# Patient Record
Sex: FEMALE | Race: WHITE | NOT HISPANIC OR LATINO | Employment: FULL TIME | URBAN - METROPOLITAN AREA
[De-identification: names, ages, dates, MRNs, and addresses within clinical notes are randomized per-mention and may not be internally consistent; named-entity substitution may affect disease eponyms.]

---

## 2017-09-18 ENCOUNTER — OFFICE VISIT (OUTPATIENT)
Dept: OBSTETRICS AND GYNECOLOGY | Facility: CLINIC | Age: 37
End: 2017-09-18

## 2017-09-18 VITALS
WEIGHT: 209 LBS | BODY MASS INDEX: 33.59 KG/M2 | DIASTOLIC BLOOD PRESSURE: 99 MMHG | HEIGHT: 66 IN | HEART RATE: 110 BPM | SYSTOLIC BLOOD PRESSURE: 145 MMHG

## 2017-09-18 DIAGNOSIS — Z01.411 ENCOUNTER FOR GYNECOLOGICAL EXAMINATION WITH ABNORMAL FINDING: Primary | ICD-10-CM

## 2017-09-18 DIAGNOSIS — N92.0 MENORRHAGIA WITH REGULAR CYCLE: ICD-10-CM

## 2017-09-18 DIAGNOSIS — N94.6 DYSMENORRHEA: ICD-10-CM

## 2017-09-18 PROCEDURE — 99203 OFFICE O/P NEW LOW 30 MIN: CPT | Performed by: OBSTETRICS & GYNECOLOGY

## 2017-09-18 RX ORDER — PROMETHAZINE HYDROCHLORIDE 12.5 MG/1
TABLET ORAL
COMMUNITY
End: 2021-04-19

## 2017-09-18 RX ORDER — GABAPENTIN 100 MG/1
100 CAPSULE ORAL 3 TIMES DAILY
COMMUNITY
End: 2020-06-29

## 2017-09-18 RX ORDER — TRAMADOL HYDROCHLORIDE 50 MG/1
50 TABLET ORAL EVERY 6 HOURS PRN
COMMUNITY
End: 2019-09-10 | Stop reason: SDUPTHER

## 2017-09-18 NOTE — PROGRESS NOTES
Subjective   Serenity Colbert is a 37 y.o. female  2, Para 2 AB 0, Living 2.  Last annual 2y, last pap 2y, last mammogram 0, last colonoscopy 0.  Cc: Irregular bleeding  History of Present Illness  She has a three-month history of heavy periods.  Prior That time her periods have been regular with moderate flow.  She is passing blood clots and having significant dysmenorrhea and she is losing time at work.  She does have a history of migraines associated with an aura and her migraines seem to appear around ovulation time.  Denies dyspareunia.  Is having some hot flashes.  Patient's family history is positive for breast cancer in her mother at age 49.  ( at age 54 metastatic disease).  She is having postovulatory bloating.  The following portions of the patient's history were reviewed and updated as appropriate: allergies, current medications, past family history, past medical history, past social history, past surgical history and problem list.    Review of Systems   Genitourinary: Positive for menstrual problem.   All other systems reviewed and are negative.        Past Medical History:   Diagnosis Date   • Atrial septal defect     as a child. -repared   • Fibromyalgia    • Ganglion cyst    • Lyme disease    • Migraine with aura      Menstrual History:  OB History      Para Term  AB TAB SAB Ectopic Multiple Living    2 2 2       2         Menarche age:9  Patient's last menstrual period was 2017 (exact date).  Period Cycle (Days): 28  Period Duration (Days): 8  Period Pattern: Regular  Menstrual Flow: Heavy  Menstrual Control: Maxi pad  Menstrual Control Change Freq (Hours): 2  Dysmenorrhea: (!) Severe  Dysmenorrhea Symptoms: Cramping    Past Surgical History:   Procedure Laterality Date   • CARDIAC SURGERY      open heart for defect repair of VSD   • GALLBLADDER SURGERY     • GANGLION CYST EXCISION       OB History      Para Term  AB TAB SAB Ectopic Multiple Living  "   2 2 2       2        Family History   Problem Relation Age of Onset   • Breast cancer Mother 49   • Heart attack Father    • Leukemia Paternal Grandmother    • Colon cancer Maternal Aunt      History   Smoking Status   • Never Smoker   Smokeless Tobacco   • Never Used     History   Alcohol Use   • Yes     Comment: Rare     Health Maintenance   Topic Date Due   • TDAP/TD VACCINES (1 - Tdap) 06/26/1999   • INFLUENZA VACCINE  08/01/2017   • PAP SMEAR  09/18/2018       Current Outpatient Prescriptions:   •  gabapentin (NEURONTIN) 100 MG capsule, Take 100 mg by mouth 3 (Three) Times a Day., Disp: , Rfl:   •  linaclotide (LINZESS) 145 MCG capsule capsule, Take 145 mcg by mouth Every Morning Before Breakfast., Disp: , Rfl:   •  Milnacipran HCl (SAVELLA) 100 MG tablet, Take  by mouth., Disp: , Rfl:   •  traMADol (ULTRAM) 50 MG tablet, Take 50 mg by mouth Every 6 (Six) Hours As Needed for Moderate Pain ., Disp: , Rfl:   •  promethazine (PHENERGAN) 12.5 MG tablet, Take  by mouth., Disp: , Rfl:   Sexual History:Active  Sexually Transmitted Infection History: None        Objective   Vitals:    09/18/17 1534   BP: 145/99   Pulse: 110   Weight: 209 lb (94.8 kg)   Height: 65.5\" (166.4 cm)     Physical Exam   Constitutional: She is oriented to person, place, and time. She appears well-developed and well-nourished.   HENT:   Head: Normocephalic.   Eyes: Pupils are equal, round, and reactive to light.   Neck: Normal range of motion. No thyromegaly present.   Cardiovascular: Normal rate, regular rhythm, normal heart sounds and intact distal pulses.    Pulmonary/Chest: Effort normal and breath sounds normal. No respiratory distress. She exhibits no tenderness. Right breast exhibits no inverted nipple, no mass, no nipple discharge, no skin change and no tenderness. Left breast exhibits no inverted nipple, no mass, no nipple discharge, no skin change and no tenderness. Breasts are symmetrical.   Abdominal: Soft. Bowel sounds are " normal. Hernia confirmed negative in the right inguinal area and confirmed negative in the left inguinal area.   Genitourinary: Rectum normal, vagina normal and uterus normal. Rectal exam shows no external hemorrhoid, no internal hemorrhoid, no fissure, no mass, no tenderness and anal tone normal. No breast tenderness or discharge. Pelvic exam was performed with patient supine. There is no rash, tenderness, lesion or injury on the right labia. There is no rash, tenderness, lesion or injury on the left labia. Uterus is not enlarged and not tender. Cervix exhibits no motion tenderness, no discharge and no friability. Right adnexum displays no mass, no tenderness and no fullness. Left adnexum displays no mass, no tenderness and no fullness.   Lymphadenopathy:     She has no cervical adenopathy.        Right: No inguinal adenopathy present.        Left: No inguinal adenopathy present.   Neurological: She is alert and oriented to person, place, and time. She has normal reflexes.   Skin: Skin is warm and dry.   Psychiatric: She has a normal mood and affect. Her behavior is normal. Judgment and thought content normal.         Assessment/Plan   Serenity was seen today for menstrual problem.    Diagnoses and all orders for this visit:    Encounter for gynecological examination with abnormal finding    Menorrhagia with regular cycle  Comments:  Schedule ultrasound was same-day appointment    Dysmenorrhea    Counseled about menses, use of birth control pills with estrogen in migraine patient's, breast self-examination

## 2017-09-25 ENCOUNTER — OFFICE VISIT (OUTPATIENT)
Dept: OBSTETRICS AND GYNECOLOGY | Facility: CLINIC | Age: 37
End: 2017-09-25

## 2017-09-25 ENCOUNTER — PROCEDURE VISIT (OUTPATIENT)
Dept: OBSTETRICS AND GYNECOLOGY | Facility: CLINIC | Age: 37
End: 2017-09-25

## 2017-09-25 VITALS
BODY MASS INDEX: 33.59 KG/M2 | WEIGHT: 209 LBS | SYSTOLIC BLOOD PRESSURE: 147 MMHG | DIASTOLIC BLOOD PRESSURE: 96 MMHG | HEART RATE: 98 BPM | HEIGHT: 66 IN

## 2017-09-25 DIAGNOSIS — N92.0 MENORRHAGIA WITH REGULAR CYCLE: Primary | ICD-10-CM

## 2017-09-25 DIAGNOSIS — N94.6 DYSMENORRHEA: ICD-10-CM

## 2017-09-25 PROCEDURE — 99213 OFFICE O/P EST LOW 20 MIN: CPT | Performed by: OBSTETRICS & GYNECOLOGY

## 2017-09-25 PROCEDURE — 76830 TRANSVAGINAL US NON-OB: CPT | Performed by: OBSTETRICS & GYNECOLOGY

## 2017-09-25 RX ORDER — ACETAMINOPHEN AND CODEINE PHOSPHATE 120; 12 MG/5ML; MG/5ML
1 SOLUTION ORAL DAILY
Qty: 28 TABLET | Refills: 12 | Status: SHIPPED | OUTPATIENT
Start: 2017-09-25 | End: 2018-09-25

## 2018-10-22 RX ORDER — ACETAMINOPHEN AND CODEINE PHOSPHATE 120; 12 MG/5ML; MG/5ML
1 SOLUTION ORAL DAILY
Qty: 28 TABLET | Refills: 3 | Status: SHIPPED | OUTPATIENT
Start: 2018-10-22 | End: 2019-10-22

## 2019-09-10 ENCOUNTER — OFFICE VISIT (OUTPATIENT)
Dept: FAMILY MEDICINE CLINIC | Facility: CLINIC | Age: 39
End: 2019-09-10

## 2019-09-10 VITALS
OXYGEN SATURATION: 99 % | HEIGHT: 65 IN | TEMPERATURE: 97.6 F | DIASTOLIC BLOOD PRESSURE: 80 MMHG | HEART RATE: 106 BPM | SYSTOLIC BLOOD PRESSURE: 118 MMHG | WEIGHT: 158 LBS | BODY MASS INDEX: 26.33 KG/M2

## 2019-09-10 DIAGNOSIS — G89.29 OTHER CHRONIC PAIN: ICD-10-CM

## 2019-09-10 DIAGNOSIS — F51.01 PRIMARY INSOMNIA: ICD-10-CM

## 2019-09-10 DIAGNOSIS — J30.1 ALLERGIC RHINITIS DUE TO POLLEN, UNSPECIFIED SEASONALITY: ICD-10-CM

## 2019-09-10 DIAGNOSIS — E53.8 B12 DEFICIENCY: ICD-10-CM

## 2019-09-10 DIAGNOSIS — Z79.899 HIGH RISK MEDICATION USE: Primary | ICD-10-CM

## 2019-09-10 DIAGNOSIS — E55.9 VITAMIN D DEFICIENCY: ICD-10-CM

## 2019-09-10 DIAGNOSIS — R53.83 FATIGUE, UNSPECIFIED TYPE: ICD-10-CM

## 2019-09-10 DIAGNOSIS — G43.109 MIGRAINE WITH AURA AND WITHOUT STATUS MIGRAINOSUS, NOT INTRACTABLE: ICD-10-CM

## 2019-09-10 DIAGNOSIS — F41.9 ANXIETY: ICD-10-CM

## 2019-09-10 DIAGNOSIS — M79.7 FIBROMYALGIA: ICD-10-CM

## 2019-09-10 PROCEDURE — 99214 OFFICE O/P EST MOD 30 MIN: CPT | Performed by: FAMILY MEDICINE

## 2019-09-10 RX ORDER — FLUTICASONE PROPIONATE 50 MCG
2 SPRAY, SUSPENSION (ML) NASAL DAILY
Qty: 1 BOTTLE | Refills: 5 | Status: SHIPPED | OUTPATIENT
Start: 2019-09-10 | End: 2021-04-19 | Stop reason: SDUPTHER

## 2019-09-10 RX ORDER — CYANOCOBALAMIN 1000 UG/ML
1000 INJECTION, SOLUTION INTRAMUSCULAR; SUBCUTANEOUS
Qty: 10 ML | Refills: 1 | Status: SHIPPED | OUTPATIENT
Start: 2019-09-10 | End: 2020-06-29 | Stop reason: SDUPTHER

## 2019-09-10 RX ORDER — HYDROXYZINE HYDROCHLORIDE 25 MG/1
25 TABLET, FILM COATED ORAL
COMMUNITY
End: 2019-09-10 | Stop reason: SDUPTHER

## 2019-09-10 RX ORDER — ONDANSETRON 4 MG/1
4 TABLET, FILM COATED ORAL AS NEEDED
COMMUNITY
End: 2020-06-29 | Stop reason: SDUPTHER

## 2019-09-10 RX ORDER — TOPIRAMATE 100 MG/1
100 CAPSULE, EXTENDED RELEASE ORAL DAILY
COMMUNITY
End: 2019-09-15 | Stop reason: SDUPTHER

## 2019-09-10 RX ORDER — HYDROXYZINE HYDROCHLORIDE 25 MG/1
25 TABLET, FILM COATED ORAL
Qty: 30 TABLET | Refills: 3 | Status: SHIPPED | OUTPATIENT
Start: 2019-09-10 | End: 2020-01-20

## 2019-09-10 RX ORDER — TRAMADOL HYDROCHLORIDE 50 MG/1
50 TABLET ORAL EVERY 6 HOURS PRN
Qty: 120 TABLET | Refills: 2 | Status: SHIPPED | OUTPATIENT
Start: 2019-09-10 | End: 2020-03-24 | Stop reason: SDUPTHER

## 2019-09-10 RX ORDER — CYANOCOBALAMIN 1000 UG/ML
1000 INJECTION, SOLUTION INTRAMUSCULAR; SUBCUTANEOUS
COMMUNITY
End: 2019-09-10 | Stop reason: SDUPTHER

## 2019-09-10 RX ORDER — AMITRIPTYLINE HYDROCHLORIDE 25 MG/1
25 TABLET, FILM COATED ORAL NIGHTLY
Qty: 30 TABLET | Refills: 3 | Status: SHIPPED | OUTPATIENT
Start: 2019-09-10 | End: 2020-01-20

## 2019-09-10 NOTE — PROGRESS NOTES
Chief Complaint   Patient presents with   • Med Refill     Pt is here for refills on her migraine and b12 medication.        Subjective   Serenity Colbert is a 39 y.o. female.     History of Present Illness     PT is here to get reestablished and needs refills and   Fibromyalgia- stable with meds and need refill.  B12 deficiency stable with meds. And stable  Anxiety- needs refills but is not helping her sleep.  Insomnia- wants to try a different medications  Allergies stable with meds needs refills on nose spray.    The following portions of the patient's history were reviewed and updated as appropriate: allergies, current medications, past family history, past medical history, past social history, past surgical history and problem list.    Review of Systems   Constitutional: Negative for fatigue.   Eyes: Negative for blurred vision.   Respiratory: Negative for cough, chest tightness and shortness of breath.    Cardiovascular: Negative for chest pain, palpitations and leg swelling.   Neurological: Negative for dizziness, light-headedness and headache.       Patient Active Problem List   Diagnosis   • Migraine with aura   • Lyme disease   • Ganglion cyst   • Fibromyalgia   • Atrial septal defect       Allergies   Allergen Reactions   • Peanut-Containing Drug Products Anaphylaxis   • Heparin Other (See Comments)     BP dropped    • Ibuprofen Other (See Comments)     Stomach bleeding   • Nsaids          Current Outpatient Medications:   •  cyanocobalamin 1000 MCG/ML injection, Inject 1 mL into the appropriate muscle as directed by prescriber Every 28 (Twenty-Eight) Days., Disp: 10 mL, Rfl: 1  •  linaclotide (LINZESS) 145 MCG capsule capsule, Take 145 mcg by mouth Every Morning Before Breakfast., Disp: , Rfl:   •  Milnacipran HCl (SAVELLA) 100 MG tablet, Take 100 tablets by mouth 2 (Two) Times a Day., Disp: 60 tablet, Rfl: 5  •  ondansetron (ZOFRAN) 4 MG tablet, Take 4 mg by mouth As Needed for Nausea or Vomiting.,  "Disp: , Rfl:   •  Topiramate  MG capsule sustained-release 24 hr, Take 100 mg by mouth Daily., Disp: , Rfl:   •  traMADol (ULTRAM) 50 MG tablet, Take 1 tablet by mouth Every 6 (Six) Hours As Needed for Moderate Pain ., Disp: 120 tablet, Rfl: 2  •  amitriptyline (ELAVIL) 25 MG tablet, Take 1 tablet by mouth Every Night., Disp: 30 tablet, Rfl: 3  •  fluticasone (FLONASE) 50 MCG/ACT nasal spray, 2 sprays into the nostril(s) as directed by provider Daily., Disp: 1 bottle, Rfl: 5  •  gabapentin (NEURONTIN) 100 MG capsule, Take 100 mg by mouth 3 (Three) Times a Day., Disp: , Rfl:   •  hydrOXYzine (ATARAX) 25 MG tablet, Take 1 tablet by mouth every night at bedtime., Disp: 30 tablet, Rfl: 3  •  norethindrone (MICRONOR) 0.35 MG tablet, Take 1 tablet by mouth Daily., Disp: 28 tablet, Rfl: 3  •  promethazine (PHENERGAN) 12.5 MG tablet, Take  by mouth., Disp: , Rfl:     Past Medical History:   Diagnosis Date   • GUIDO positive    • Anxiety    • Arthritis    • Atrial septal defect     as a child. -repared   • B12 deficiency    • Fibromyalgia    • Ganglion cyst    • High risk medication use    • IBS (irritable bowel syndrome)    • Lyme disease    • Migraine with aura    • Plantar fasciitis    • Vitamin D deficiency        Past Surgical History:   Procedure Laterality Date   • CARDIAC SURGERY      open heart for defect repair of VSD   • GALLBLADDER SURGERY  2007   • GANGLION CYST EXCISION         Family History   Problem Relation Age of Onset   • Breast cancer Mother 49   • Heart attack Father    • Leukemia Paternal Grandmother    • Colon cancer Maternal Aunt        Social History     Tobacco Use   • Smoking status: Never Smoker   • Smokeless tobacco: Never Used   Substance Use Topics   • Alcohol use: Yes     Comment: Rare            Objective     Visit Vitals  /80   Pulse 106   Temp 97.6 °F (36.4 °C)   Ht 165.1 cm (65\")   Wt 71.7 kg (158 lb)   SpO2 99%   BMI 26.29 kg/m²       Physical Exam   Constitutional: She appears " well-developed. No distress.   Eyes: Conjunctivae and lids are normal.   Neck: Trachea normal. Carotid bruit is not present. No thyroid mass and no thyromegaly present.   Cardiovascular: Normal rate, regular rhythm and normal heart sounds.   Pulmonary/Chest: Effort normal and breath sounds normal.   Lymphadenopathy:     She has no cervical adenopathy.   Neurological: She is alert. She is not disoriented.   Skin: Skin is warm and dry.   Psychiatric: She has a normal mood and affect. Her speech is normal and behavior is normal. She is attentive.   Nursing note and vitals reviewed.      No results found for: GLUCOSE, BUN, CREATININE, EGFRIFNONA, EGFRIFAFRI, BCR, K, CO2, CALCIUM, PROTENTOTREF, ALBUMIN, LABIL2, AST, ALT    No results found for: WBC, RBC, HGB, HCT, MCV, MCH, MCHC, RDW, RDWSD, MPV, PLT, NEUTRORELPCT, LYMPHORELPCT, MONORELPCT, EOSRELPCT, BASORELPCT, AUTOIGPER, NEUTROABS, LYMPHSABS, MONOSABS, EOSABS, BASOSABS, AUTOIGNUM, NRBC    No results found for: HGBA1C    No results found for: QTAKOQDW51    No results found for: TSH    No results found for: CHOL  No results found for: TRIG  No results found for: HDL  No results found for: LDL  No results found for: VLDL  No results found for: LDLHDL      Procedures    Assessment/Plan   Problems Addressed this Visit        Cardiovascular and Mediastinum    Migraine with aura    Relevant Medications    Topiramate  MG capsule sustained-release 24 hr    traMADol (ULTRAM) 50 MG tablet    amitriptyline (ELAVIL) 25 MG tablet       Nervous and Auditory    Fibromyalgia    Relevant Medications    traMADol (ULTRAM) 50 MG tablet    Milnacipran HCl (SAVELLA) 100 MG tablet      Other Visit Diagnoses     High risk medication use    -  Primary    Relevant Orders    Compliance Drug Analysis, Ur - Urine, Clean Catch    Lipid Panel With / Chol / HDL Ratio    Other chronic pain        Relevant Medications    traMADol (ULTRAM) 50 MG tablet    Primary insomnia        Relevant  Medications    amitriptyline (ELAVIL) 25 MG tablet    B12 deficiency        Relevant Medications    cyanocobalamin 1000 MCG/ML injection    Other Relevant Orders    Vitamin B12    Allergic rhinitis due to pollen, unspecified seasonality        Relevant Medications    fluticasone (FLONASE) 50 MCG/ACT nasal spray    Anxiety        Relevant Medications    hydrOXYzine (ATARAX) 25 MG tablet    Fatigue, unspecified type        Relevant Orders    Comprehensive Metabolic Panel    TSH Rfx On Abnormal To Free T4    CBC & Differential    Vitamin D deficiency        Relevant Orders    Vitamin D 25 Hydroxy          Orders Placed This Encounter   Procedures   • Compliance Drug Analysis, Ur - Urine, Clean Catch   • Comprehensive Metabolic Panel   • Lipid Panel With / Chol / HDL Ratio   • Vitamin B12   • TSH Rfx On Abnormal To Free T4   • Vitamin D 25 Hydroxy   • CBC & Differential     Order Specific Question:   Manual Differential     Answer:   No       Current Outpatient Medications   Medication Sig Dispense Refill   • cyanocobalamin 1000 MCG/ML injection Inject 1 mL into the appropriate muscle as directed by prescriber Every 28 (Twenty-Eight) Days. 10 mL 1   • linaclotide (LINZESS) 145 MCG capsule capsule Take 145 mcg by mouth Every Morning Before Breakfast.     • Milnacipran HCl (SAVELLA) 100 MG tablet Take 100 tablets by mouth 2 (Two) Times a Day. 60 tablet 5   • ondansetron (ZOFRAN) 4 MG tablet Take 4 mg by mouth As Needed for Nausea or Vomiting.     • Topiramate  MG capsule sustained-release 24 hr Take 100 mg by mouth Daily.     • traMADol (ULTRAM) 50 MG tablet Take 1 tablet by mouth Every 6 (Six) Hours As Needed for Moderate Pain . 120 tablet 2   • amitriptyline (ELAVIL) 25 MG tablet Take 1 tablet by mouth Every Night. 30 tablet 3   • fluticasone (FLONASE) 50 MCG/ACT nasal spray 2 sprays into the nostril(s) as directed by provider Daily. 1 bottle 5   • gabapentin (NEURONTIN) 100 MG capsule Take 100 mg by mouth 3  (Three) Times a Day.     • hydrOXYzine (ATARAX) 25 MG tablet Take 1 tablet by mouth every night at bedtime. 30 tablet 3   • norethindrone (MICRONOR) 0.35 MG tablet Take 1 tablet by mouth Daily. 28 tablet 3   • promethazine (PHENERGAN) 12.5 MG tablet Take  by mouth.       No current facility-administered medications for this visit.        Return in about 3 months (around 12/10/2019).    There are no Patient Instructions on file for this visit.

## 2019-09-11 LAB
25(OH)D3+25(OH)D2 SERPL-MCNC: 23.6 NG/ML (ref 30–100)
ALBUMIN SERPL-MCNC: 4.6 G/DL (ref 3.5–5.2)
ALBUMIN/GLOB SERPL: 1.9 G/DL
ALP SERPL-CCNC: 89 U/L (ref 39–117)
ALT SERPL-CCNC: 8 U/L (ref 1–33)
AST SERPL-CCNC: 13 U/L (ref 1–32)
BASOPHILS # BLD AUTO: 0.05 10*3/MM3 (ref 0–0.2)
BASOPHILS NFR BLD AUTO: 0.3 % (ref 0–1.5)
BILIRUB SERPL-MCNC: 0.6 MG/DL (ref 0.2–1.2)
BUN SERPL-MCNC: 6 MG/DL (ref 6–20)
BUN/CREAT SERPL: 9 (ref 7–25)
CALCIUM SERPL-MCNC: 8.9 MG/DL (ref 8.6–10.5)
CHLORIDE SERPL-SCNC: 103 MMOL/L (ref 98–107)
CHOLEST SERPL-MCNC: 159 MG/DL (ref 0–200)
CHOLEST/HDLC SERPL: 2.79 {RATIO}
CO2 SERPL-SCNC: 22.6 MMOL/L (ref 22–29)
CREAT SERPL-MCNC: 0.67 MG/DL (ref 0.57–1)
EOSINOPHIL # BLD AUTO: 0.31 10*3/MM3 (ref 0–0.4)
EOSINOPHIL NFR BLD AUTO: 1.7 % (ref 0.3–6.2)
ERYTHROCYTE [DISTWIDTH] IN BLOOD BY AUTOMATED COUNT: 14.4 % (ref 12.3–15.4)
GLOBULIN SER CALC-MCNC: 2.4 GM/DL
GLUCOSE SERPL-MCNC: 78 MG/DL (ref 65–99)
HCT VFR BLD AUTO: 47.4 % (ref 34–46.6)
HDLC SERPL-MCNC: 57 MG/DL (ref 40–60)
HGB BLD-MCNC: 14.4 G/DL (ref 12–15.9)
IMM GRANULOCYTES # BLD AUTO: 0.09 10*3/MM3 (ref 0–0.05)
IMM GRANULOCYTES NFR BLD AUTO: 0.5 % (ref 0–0.5)
LDLC SERPL CALC-MCNC: 81 MG/DL (ref 0–100)
LYMPHOCYTES # BLD AUTO: 2.04 10*3/MM3 (ref 0.7–3.1)
LYMPHOCYTES NFR BLD AUTO: 11 % (ref 19.6–45.3)
MCH RBC QN AUTO: 26.9 PG (ref 26.6–33)
MCHC RBC AUTO-ENTMCNC: 30.4 G/DL (ref 31.5–35.7)
MCV RBC AUTO: 88.4 FL (ref 79–97)
MONOCYTES # BLD AUTO: 0.94 10*3/MM3 (ref 0.1–0.9)
MONOCYTES NFR BLD AUTO: 5.1 % (ref 5–12)
NEUTROPHILS # BLD AUTO: 15.11 10*3/MM3 (ref 1.7–7)
NEUTROPHILS NFR BLD AUTO: 81.4 % (ref 42.7–76)
NRBC BLD AUTO-RTO: 0 /100 WBC (ref 0–0.2)
PLATELET # BLD AUTO: 365 10*3/MM3 (ref 140–450)
POTASSIUM SERPL-SCNC: 3.9 MMOL/L (ref 3.5–5.2)
PROT SERPL-MCNC: 7 G/DL (ref 6–8.5)
RBC # BLD AUTO: 5.36 10*6/MM3 (ref 3.77–5.28)
SODIUM SERPL-SCNC: 140 MMOL/L (ref 136–145)
TRIGL SERPL-MCNC: 107 MG/DL (ref 0–150)
TSH SERPL DL<=0.005 MIU/L-ACNC: 3.51 UIU/ML (ref 0.27–4.2)
VIT B12 SERPL-MCNC: 421 PG/ML (ref 211–946)
VLDLC SERPL CALC-MCNC: 21.4 MG/DL
WBC # BLD AUTO: 18.54 10*3/MM3 (ref 3.4–10.8)

## 2019-09-15 DIAGNOSIS — G43.109 MIGRAINE WITH AURA AND WITHOUT STATUS MIGRAINOSUS, NOT INTRACTABLE: Primary | ICD-10-CM

## 2019-09-15 LAB — DRUGS UR: NORMAL

## 2019-09-15 RX ORDER — TOPIRAMATE 100 MG/1
100 CAPSULE, EXTENDED RELEASE ORAL DAILY
Qty: 90 CAPSULE | Refills: 1 | Status: SHIPPED | OUTPATIENT
Start: 2019-09-15 | End: 2019-09-20 | Stop reason: SDUPTHER

## 2019-09-16 DIAGNOSIS — D72.829 LEUKOCYTOSIS, UNSPECIFIED TYPE: Primary | ICD-10-CM

## 2019-09-16 DIAGNOSIS — R10.30 LOWER ABDOMINAL PAIN: ICD-10-CM

## 2019-09-20 DIAGNOSIS — G43.109 MIGRAINE WITH AURA AND WITHOUT STATUS MIGRAINOSUS, NOT INTRACTABLE: ICD-10-CM

## 2019-09-20 RX ORDER — TOPIRAMATE 100 MG/1
100 CAPSULE, EXTENDED RELEASE ORAL DAILY
Qty: 90 CAPSULE | Refills: 1 | Status: SHIPPED | OUTPATIENT
Start: 2019-09-20 | End: 2020-06-23

## 2019-11-25 ENCOUNTER — TELEPHONE (OUTPATIENT)
Dept: FAMILY MEDICINE CLINIC | Facility: CLINIC | Age: 39
End: 2019-11-25

## 2020-01-18 DIAGNOSIS — F51.01 PRIMARY INSOMNIA: ICD-10-CM

## 2020-01-18 DIAGNOSIS — F41.9 ANXIETY: ICD-10-CM

## 2020-01-20 RX ORDER — HYDROXYZINE HYDROCHLORIDE 25 MG/1
TABLET, FILM COATED ORAL
Qty: 30 TABLET | Refills: 2 | Status: SHIPPED | OUTPATIENT
Start: 2020-01-20 | End: 2020-06-29 | Stop reason: SDUPTHER

## 2020-01-20 RX ORDER — AMITRIPTYLINE HYDROCHLORIDE 25 MG/1
TABLET, FILM COATED ORAL
Qty: 30 TABLET | Refills: 2 | Status: SHIPPED | OUTPATIENT
Start: 2020-01-20 | End: 2020-06-29

## 2020-03-19 DIAGNOSIS — M79.7 FIBROMYALGIA: ICD-10-CM

## 2020-03-19 DIAGNOSIS — G89.29 OTHER CHRONIC PAIN: ICD-10-CM

## 2020-03-20 RX ORDER — TRAMADOL HYDROCHLORIDE 50 MG/1
TABLET ORAL
Qty: 120 TABLET | Refills: 1 | OUTPATIENT
Start: 2020-03-20

## 2020-03-24 DIAGNOSIS — M79.7 FIBROMYALGIA: ICD-10-CM

## 2020-03-24 DIAGNOSIS — G89.29 OTHER CHRONIC PAIN: ICD-10-CM

## 2020-03-24 RX ORDER — TRAMADOL HYDROCHLORIDE 50 MG/1
50 TABLET ORAL EVERY 6 HOURS PRN
Qty: 120 TABLET | Refills: 0 | Status: SHIPPED | OUTPATIENT
Start: 2020-03-24 | End: 2020-06-29 | Stop reason: SDUPTHER

## 2020-06-23 DIAGNOSIS — G43.109 MIGRAINE WITH AURA AND WITHOUT STATUS MIGRAINOSUS, NOT INTRACTABLE: ICD-10-CM

## 2020-06-23 RX ORDER — TOPIRAMATE 100 MG/1
CAPSULE, EXTENDED RELEASE ORAL
Qty: 90 CAPSULE | Refills: 0 | Status: SHIPPED | OUTPATIENT
Start: 2020-06-23 | End: 2020-06-29 | Stop reason: SDUPTHER

## 2020-06-29 ENCOUNTER — OFFICE VISIT (OUTPATIENT)
Dept: FAMILY MEDICINE CLINIC | Facility: CLINIC | Age: 40
End: 2020-06-29

## 2020-06-29 VITALS
TEMPERATURE: 98.2 F | WEIGHT: 159.8 LBS | HEIGHT: 65 IN | HEART RATE: 98 BPM | OXYGEN SATURATION: 99 % | BODY MASS INDEX: 26.62 KG/M2 | SYSTOLIC BLOOD PRESSURE: 108 MMHG | DIASTOLIC BLOOD PRESSURE: 72 MMHG

## 2020-06-29 DIAGNOSIS — E53.8 B12 DEFICIENCY: ICD-10-CM

## 2020-06-29 DIAGNOSIS — M79.7 FIBROMYALGIA: ICD-10-CM

## 2020-06-29 DIAGNOSIS — F41.9 ANXIETY: ICD-10-CM

## 2020-06-29 DIAGNOSIS — F32.A ANXIETY AND DEPRESSION: ICD-10-CM

## 2020-06-29 DIAGNOSIS — G43.109 MIGRAINE WITH AURA AND WITHOUT STATUS MIGRAINOSUS, NOT INTRACTABLE: ICD-10-CM

## 2020-06-29 DIAGNOSIS — F41.9 ANXIETY AND DEPRESSION: ICD-10-CM

## 2020-06-29 DIAGNOSIS — R11.0 NAUSEA: Primary | ICD-10-CM

## 2020-06-29 DIAGNOSIS — G89.29 OTHER CHRONIC PAIN: ICD-10-CM

## 2020-06-29 PROCEDURE — 99214 OFFICE O/P EST MOD 30 MIN: CPT | Performed by: FAMILY MEDICINE

## 2020-06-29 RX ORDER — CYANOCOBALAMIN 1000 UG/ML
1000 INJECTION, SOLUTION INTRAMUSCULAR; SUBCUTANEOUS
Qty: 10 ML | Refills: 1 | Status: SHIPPED | OUTPATIENT
Start: 2020-06-29 | End: 2021-04-19 | Stop reason: SDUPTHER

## 2020-06-29 RX ORDER — TRAMADOL HYDROCHLORIDE 50 MG/1
50 TABLET ORAL EVERY 6 HOURS PRN
Qty: 120 TABLET | Refills: 0 | Status: SHIPPED | OUTPATIENT
Start: 2020-06-29 | End: 2020-09-08

## 2020-06-29 RX ORDER — HYDROXYZINE HYDROCHLORIDE 25 MG/1
25 TABLET, FILM COATED ORAL
Qty: 90 TABLET | Refills: 1 | Status: SHIPPED | OUTPATIENT
Start: 2020-06-29 | End: 2022-04-08

## 2020-06-29 RX ORDER — BUPROPION HYDROCHLORIDE 150 MG/1
150 TABLET ORAL DAILY
Qty: 30 TABLET | Refills: 3 | Status: SHIPPED | OUTPATIENT
Start: 2020-06-29 | End: 2020-10-27

## 2020-06-29 RX ORDER — ONDANSETRON 4 MG/1
4 TABLET, FILM COATED ORAL EVERY 8 HOURS PRN
Qty: 60 TABLET | Refills: 0 | Status: SHIPPED | OUTPATIENT
Start: 2020-06-29 | End: 2021-11-05 | Stop reason: SDUPTHER

## 2020-06-29 RX ORDER — TOPIRAMATE 100 MG/1
1 CAPSULE, EXTENDED RELEASE ORAL DAILY
Qty: 90 CAPSULE | Refills: 1 | Status: SHIPPED | OUTPATIENT
Start: 2020-06-29 | End: 2021-04-19 | Stop reason: SDUPTHER

## 2020-06-29 NOTE — PROGRESS NOTES
Chief Complaint   Patient presents with   • Follow-up     Pt is here for follow up. C/o trouble sleeping - stress related  Wants to discuss meds with you. Anxiety and sleep med is not helping.        Subjective   Serenity Colbert is a 40 y.o. female.     History of Present Illness   PT is here for refills and   Insomnia- stopped elavil bc of side effects;  Still uses hydroxyzine but not working as well as before.    Migraines and needs refills on zofran.  Depression and anxiety- stable and needs something for this.  No HI or SI.  Fibromyalgia-- stable and needs refills.    The following portions of the patient's history were reviewed and updated as appropriate: allergies, current medications, past family history, past medical history, past social history, past surgical history and problem list.    Review of Systems   Constitutional: Positive for fatigue.   Eyes: Negative for blurred vision.   Respiratory: Negative for cough, chest tightness and shortness of breath.    Cardiovascular: Negative for chest pain, palpitations and leg swelling.   Neurological: Negative for dizziness, light-headedness and headache.       Patient Active Problem List   Diagnosis   • Migraine with aura   • Lyme disease   • Ganglion cyst   • Fibromyalgia   • Atrial septal defect       Allergies   Allergen Reactions   • Peanut-Containing Drug Products Anaphylaxis   • Heparin Other (See Comments)     BP dropped    • Ibuprofen Other (See Comments)     Stomach bleeding   • Nsaids          Current Outpatient Medications:   •  cyanocobalamin 1000 MCG/ML injection, Inject 1 mL into the appropriate muscle as directed by prescriber Every 28 (Twenty-Eight) Days., Disp: 10 mL, Rfl: 1  •  fluticasone (FLONASE) 50 MCG/ACT nasal spray, 2 sprays into the nostril(s) as directed by provider Daily., Disp: 1 bottle, Rfl: 5  •  hydrOXYzine (ATARAX) 25 MG tablet, Take 1 tablet by mouth every night at bedtime., Disp: 90 tablet, Rfl: 1  •  linaclotide (LINZESS) 145  "MCG capsule capsule, Take 145 mcg by mouth Every Morning Before Breakfast., Disp: , Rfl:   •  Milnacipran HCl (Savella) 100 MG tablet, Take 1 tablet by mouth 2 (Two) Times a Day., Disp: 60 tablet, Rfl: 5  •  ondansetron (ZOFRAN) 4 MG tablet, Take 1 tablet by mouth Every 8 (Eight) Hours As Needed for Nausea or Vomiting., Disp: 60 tablet, Rfl: 0  •  Syringe/Needle, Disp, (BD ECLIPSE SYRINGE) 25G X 1\" 3 ML misc, USE AS DIRECTED FOR B12 INJECTION, Disp: 100 each, Rfl: 11  •  Topiramate ER (Trokendi XR) 100 MG capsule sustained-release 24 hr, Take 1 capsule by mouth Daily., Disp: 90 capsule, Rfl: 1  •  traMADol (ULTRAM) 50 MG tablet, Take 1 tablet by mouth Every 6 (Six) Hours As Needed for Moderate Pain ., Disp: 120 tablet, Rfl: 0  •  buPROPion XL (Wellbutrin XL) 150 MG 24 hr tablet, Take 1 tablet by mouth Daily., Disp: 30 tablet, Rfl: 3  •  promethazine (PHENERGAN) 12.5 MG tablet, Take  by mouth., Disp: , Rfl:     Past Medical History:   Diagnosis Date   • Acne    • Acute pharyngitis    • Acute sinusitis    • GUIDO positive    • Anxiety    • Arthritis    • Atrial septal defect     as a child. -repared   • B12 deficiency    • Balance problem    • Cellulitis    • Chest pain    • Elevated blood pressure reading    • Fatigue    • Fibromyalgia    • Flu-like symptoms    • Ganglion cyst    • Gastric ulcer    • Hand weakness    • High risk medication use    • Hot flashes    • IBS (irritable bowel syndrome)    • Lumbar strain    • Lyme disease    • Memory deficit    • Migraine headache    • Migraine with aura    • Osteoarthritis    • Palpitations    • Panic attacks    • Plantar fasciitis    • Strep throat    • Therapeutic opioid induced constipation    • Tremor    • URI (upper respiratory infection)    • UTI (urinary tract infection)    • Vaginal candidiasis    • Vaginal yeast infection    • Vitamin B12 deficiency    • Vitamin D deficiency        Past Surgical History:   Procedure Laterality Date   • CARDIAC SURGERY      open " "heart for defect repair of VSD   • GALLBLADDER SURGERY  2007   • GANGLION CYST EXCISION         Family History   Problem Relation Age of Onset   • Breast cancer Mother 49   • Heart attack Father    • Diabetes Father    • Hyperlipidemia Father    • Hypertension Father    • Leukemia Paternal Grandmother    • Colon cancer Maternal Aunt    • Hyperlipidemia Brother    • Diabetes Maternal Grandmother    • Asthma Maternal Grandmother    • COPD Maternal Grandmother    • Stroke Maternal Grandfather    • Diabetes Maternal Grandfather    • Diabetes Paternal Grandfather    • Heart disease Other         FH of heart disease in males before age 55       Social History     Tobacco Use   • Smoking status: Never Smoker   • Smokeless tobacco: Never Used   Substance Use Topics   • Alcohol use: Yes     Comment: Rare            Objective     Visit Vitals  /72   Pulse 98   Temp 98.2 °F (36.8 °C)   Ht 165.1 cm (65\")   Wt 72.5 kg (159 lb 12.8 oz)   SpO2 99%   BMI 26.59 kg/m²       Physical Exam   Constitutional: She appears well-developed. No distress.   Eyes: Conjunctivae and lids are normal.   Neck: Trachea normal. Carotid bruit is not present. No thyroid mass and no thyromegaly present.   Cardiovascular: Normal rate, regular rhythm and normal heart sounds.   Pulmonary/Chest: Effort normal and breath sounds normal. No stridor. No respiratory distress. She has no wheezes.   Lymphadenopathy:     She has no cervical adenopathy.   Neurological: She is alert. She is not disoriented.   Skin: Skin is warm and dry.   Psychiatric: She has a normal mood and affect. Her speech is normal and behavior is normal. She is attentive.   Nursing note and vitals reviewed.      Lab Results   Component Value Date    BUN 6 09/10/2019    CREATININE 0.67 09/10/2019    EGFRIFNONA 98 09/10/2019    EGFRIFAFRI 119 09/10/2019    BCR 9.0 09/10/2019    K 3.9 09/10/2019    CO2 22.6 09/10/2019    CALCIUM 8.9 09/10/2019    PROTENTOTREF 7.0 09/10/2019    ALBUMIN 4.60 " 09/10/2019    LABIL2 1.9 09/10/2019    AST 13 09/10/2019    ALT 8 09/10/2019       WBC   Date Value Ref Range Status   09/10/2019 18.54 (H) 3.40 - 10.80 10*3/mm3 Final     RBC   Date Value Ref Range Status   09/10/2019 5.36 (H) 3.77 - 5.28 10*6/mm3 Final     Hemoglobin   Date Value Ref Range Status   09/10/2019 14.4 12.0 - 15.9 g/dL Final     Hematocrit   Date Value Ref Range Status   09/10/2019 47.4 (H) 34.0 - 46.6 % Final     MCV   Date Value Ref Range Status   09/10/2019 88.4 79.0 - 97.0 fL Final     MCH   Date Value Ref Range Status   09/10/2019 26.9 26.6 - 33.0 pg Final     MCHC   Date Value Ref Range Status   09/10/2019 30.4 (L) 31.5 - 35.7 g/dL Final     RDW   Date Value Ref Range Status   09/10/2019 14.4 12.3 - 15.4 % Final     Platelets   Date Value Ref Range Status   09/10/2019 365 140 - 450 10*3/mm3 Final     Neutrophil Rel %   Date Value Ref Range Status   09/10/2019 81.4 (H) 42.7 - 76.0 % Final     Lymphocyte Rel %   Date Value Ref Range Status   09/10/2019 11.0 (L) 19.6 - 45.3 % Final     Monocyte Rel %   Date Value Ref Range Status   09/10/2019 5.1 5.0 - 12.0 % Final     Eosinophil Rel %   Date Value Ref Range Status   09/10/2019 1.7 0.3 - 6.2 % Final     Basophil Rel %   Date Value Ref Range Status   09/10/2019 0.3 0.0 - 1.5 % Final     Neutrophils Absolute   Date Value Ref Range Status   09/10/2019 15.11 (H) 1.70 - 7.00 10*3/mm3 Final     Lymphocytes Absolute   Date Value Ref Range Status   09/10/2019 2.04 0.70 - 3.10 10*3/mm3 Final     Monocytes Absolute   Date Value Ref Range Status   09/10/2019 0.94 (H) 0.10 - 0.90 10*3/mm3 Final     Eosinophils Absolute   Date Value Ref Range Status   09/10/2019 0.31 0.00 - 0.40 10*3/mm3 Final     Basophils Absolute   Date Value Ref Range Status   09/10/2019 0.05 0.00 - 0.20 10*3/mm3 Final     nRBC   Date Value Ref Range Status   09/10/2019 0.0 0.0 - 0.2 /100 WBC Final       No results found for: HGBA1C    Lab Results   Component Value Date    ASSNYBCU61 421  09/10/2019       TSH   Date Value Ref Range Status   09/10/2019 3.510 0.270 - 4.200 uIU/mL Final       No results found for: CHOL  Lab Results   Component Value Date    TRIG 107 09/10/2019     Lab Results   Component Value Date    HDL 57 09/10/2019     Lab Results   Component Value Date    LDL 81 09/10/2019     Lab Results   Component Value Date    VLDL 21.4 09/10/2019     No results found for: LDLHDL      Procedures    Assessment/Plan   Problems Addressed this Visit        Cardiovascular and Mediastinum    Migraine with aura    Relevant Medications    ondansetron (ZOFRAN) 4 MG tablet    traMADol (ULTRAM) 50 MG tablet    Topiramate ER (Trokendi XR) 100 MG capsule sustained-release 24 hr    buPROPion XL (Wellbutrin XL) 150 MG 24 hr tablet       Nervous and Auditory    Fibromyalgia    Relevant Medications    traMADol (ULTRAM) 50 MG tablet    Milnacipran HCl (Savella) 100 MG tablet      Other Visit Diagnoses     Nausea    -  Primary    Relevant Medications    ondansetron (ZOFRAN) 4 MG tablet    Other chronic pain        Relevant Medications    traMADol (ULTRAM) 50 MG tablet    Anxiety        Relevant Medications    hydrOXYzine (ATARAX) 25 MG tablet    Anxiety and depression        Relevant Medications    Milnacipran HCl (Savella) 100 MG tablet    hydrOXYzine (ATARAX) 25 MG tablet    buPROPion XL (Wellbutrin XL) 150 MG 24 hr tablet          No orders of the defined types were placed in this encounter.      Current Outpatient Medications   Medication Sig Dispense Refill   • cyanocobalamin 1000 MCG/ML injection Inject 1 mL into the appropriate muscle as directed by prescriber Every 28 (Twenty-Eight) Days. 10 mL 1   • fluticasone (FLONASE) 50 MCG/ACT nasal spray 2 sprays into the nostril(s) as directed by provider Daily. 1 bottle 5   • hydrOXYzine (ATARAX) 25 MG tablet Take 1 tablet by mouth every night at bedtime. 90 tablet 1   • linaclotide (LINZESS) 145 MCG capsule capsule Take 145 mcg by mouth Every Morning Before  "Breakfast.     • Milnacipran HCl (Savella) 100 MG tablet Take 1 tablet by mouth 2 (Two) Times a Day. 60 tablet 5   • ondansetron (ZOFRAN) 4 MG tablet Take 1 tablet by mouth Every 8 (Eight) Hours As Needed for Nausea or Vomiting. 60 tablet 0   • Syringe/Needle, Disp, (BD ECLIPSE SYRINGE) 25G X 1\" 3 ML misc USE AS DIRECTED FOR B12 INJECTION 100 each 11   • Topiramate ER (Trokendi XR) 100 MG capsule sustained-release 24 hr Take 1 capsule by mouth Daily. 90 capsule 1   • traMADol (ULTRAM) 50 MG tablet Take 1 tablet by mouth Every 6 (Six) Hours As Needed for Moderate Pain . 120 tablet 0   • buPROPion XL (Wellbutrin XL) 150 MG 24 hr tablet Take 1 tablet by mouth Daily. 30 tablet 3   • promethazine (PHENERGAN) 12.5 MG tablet Take  by mouth.       No current facility-administered medications for this visit.      Start wellbutrin and SE discussed with pt.    Refills and   ANDRES RUN  and reviewed.  Risks of the medication include but are not limited to fatigue, somnolence, increased risk of falls, constipation, allergic reaction, dependence, and addiction.  Also, emphasized not taking any illicit substances.  Patient is aware and acknowledges information given.        Return in about 3 months (around 9/29/2020).    Patient Instructions   Continue diet and exercise.           "

## 2020-09-06 DIAGNOSIS — M79.7 FIBROMYALGIA: ICD-10-CM

## 2020-09-06 DIAGNOSIS — G89.29 OTHER CHRONIC PAIN: ICD-10-CM

## 2020-09-08 RX ORDER — TRAMADOL HYDROCHLORIDE 50 MG/1
TABLET ORAL
Qty: 120 TABLET | Refills: 0 | Status: SHIPPED | OUTPATIENT
Start: 2020-09-08 | End: 2021-04-19 | Stop reason: SDUPTHER

## 2020-10-27 DIAGNOSIS — F32.A ANXIETY AND DEPRESSION: ICD-10-CM

## 2020-10-27 DIAGNOSIS — F41.9 ANXIETY AND DEPRESSION: ICD-10-CM

## 2020-10-27 RX ORDER — BUPROPION HYDROCHLORIDE 150 MG/1
TABLET ORAL
Qty: 30 TABLET | Refills: 1 | Status: SHIPPED | OUTPATIENT
Start: 2020-10-27 | End: 2021-02-22

## 2020-12-07 DIAGNOSIS — G43.109 MIGRAINE WITH AURA AND WITHOUT STATUS MIGRAINOSUS, NOT INTRACTABLE: Primary | ICD-10-CM

## 2020-12-07 DIAGNOSIS — M79.7 FIBROMYALGIA: ICD-10-CM

## 2020-12-07 DIAGNOSIS — G89.29 OTHER CHRONIC PAIN: ICD-10-CM

## 2020-12-07 RX ORDER — TOPIRAMATE 100 MG/1
100 TABLET, FILM COATED ORAL 2 TIMES DAILY
Qty: 60 TABLET | Refills: 3 | Status: SHIPPED | OUTPATIENT
Start: 2020-12-07 | End: 2021-04-19

## 2020-12-08 RX ORDER — TRAMADOL HYDROCHLORIDE 50 MG/1
TABLET ORAL
Qty: 120 TABLET | Refills: 0 | OUTPATIENT
Start: 2020-12-08

## 2021-02-20 DIAGNOSIS — F32.A ANXIETY AND DEPRESSION: ICD-10-CM

## 2021-02-20 DIAGNOSIS — F41.9 ANXIETY AND DEPRESSION: ICD-10-CM

## 2021-02-22 RX ORDER — BUPROPION HYDROCHLORIDE 150 MG/1
TABLET ORAL
Qty: 30 TABLET | Refills: 0 | Status: SHIPPED | OUTPATIENT
Start: 2021-02-22 | End: 2021-03-22

## 2021-03-20 DIAGNOSIS — F32.A ANXIETY AND DEPRESSION: ICD-10-CM

## 2021-03-20 DIAGNOSIS — F41.9 ANXIETY AND DEPRESSION: ICD-10-CM

## 2021-03-22 NOTE — TELEPHONE ENCOUNTER
LOV 6/29/20  NOV Not on file   **NEED TO SCHED F/U APPT  Labs 9/10/19  Last RF 2/22/21    HUB MAY RELAY MESSAGE TO PATIENT AND RESPOND.    PLEASE REPLY THAT PT HAS BEEN NOTIFIED.   LMTCB to sched a f/u appt asap to his refills.

## 2021-03-23 NOTE — TELEPHONE ENCOUNTER
Left message to return call.  HUB MAY RELAY MESSAGE TO PATIENT AND RESPOND.    PLEASE REPLY THAT PT HAS BEEN NOTIFIED.   LMTCB to sched a f/u appt asap to his refills.

## 2021-03-24 RX ORDER — AZITHROMYCIN 250 MG/1
TABLET, FILM COATED ORAL
Qty: 6 TABLET | Refills: 0 | Status: SHIPPED | OUTPATIENT
Start: 2021-03-24 | End: 2021-10-08

## 2021-03-31 RX ORDER — BUPROPION HYDROCHLORIDE 150 MG/1
150 TABLET ORAL DAILY
Qty: 30 TABLET | Refills: 0 | Status: SHIPPED | OUTPATIENT
Start: 2021-03-31 | End: 2021-04-19 | Stop reason: SDUPTHER

## 2021-03-31 NOTE — TELEPHONE ENCOUNTER
Left message to return call.  Needs appt before any refills - HUB MAY RELAY MESSAGE TO PATIENT AND RESPOND.    PLEASE REPLY THAT PT HAS BEEN NOTIFIED.

## 2021-04-02 ENCOUNTER — BULK ORDERING (OUTPATIENT)
Dept: CASE MANAGEMENT | Facility: OTHER | Age: 41
End: 2021-04-02

## 2021-04-02 DIAGNOSIS — Z23 IMMUNIZATION DUE: ICD-10-CM

## 2021-04-19 ENCOUNTER — OFFICE VISIT (OUTPATIENT)
Dept: FAMILY MEDICINE CLINIC | Facility: CLINIC | Age: 41
End: 2021-04-19

## 2021-04-19 VITALS
TEMPERATURE: 97.9 F | HEIGHT: 65 IN | WEIGHT: 167.8 LBS | DIASTOLIC BLOOD PRESSURE: 88 MMHG | BODY MASS INDEX: 27.96 KG/M2 | HEART RATE: 88 BPM | SYSTOLIC BLOOD PRESSURE: 134 MMHG | OXYGEN SATURATION: 100 %

## 2021-04-19 DIAGNOSIS — K59.09 OTHER CONSTIPATION: Primary | ICD-10-CM

## 2021-04-19 DIAGNOSIS — M79.7 FIBROMYALGIA: ICD-10-CM

## 2021-04-19 DIAGNOSIS — E53.8 B12 DEFICIENCY: ICD-10-CM

## 2021-04-19 DIAGNOSIS — Z13.6 SCREENING FOR CARDIOVASCULAR CONDITION: ICD-10-CM

## 2021-04-19 DIAGNOSIS — F41.9 ANXIETY AND DEPRESSION: ICD-10-CM

## 2021-04-19 DIAGNOSIS — G43.109 MIGRAINE WITH AURA AND WITHOUT STATUS MIGRAINOSUS, NOT INTRACTABLE: ICD-10-CM

## 2021-04-19 DIAGNOSIS — R53.82 CHRONIC FATIGUE: ICD-10-CM

## 2021-04-19 DIAGNOSIS — F32.A ANXIETY AND DEPRESSION: ICD-10-CM

## 2021-04-19 DIAGNOSIS — J30.1 ALLERGIC RHINITIS DUE TO POLLEN, UNSPECIFIED SEASONALITY: ICD-10-CM

## 2021-04-19 DIAGNOSIS — E55.9 VITAMIN D DEFICIENCY: ICD-10-CM

## 2021-04-19 DIAGNOSIS — G89.29 OTHER CHRONIC PAIN: ICD-10-CM

## 2021-04-19 PROCEDURE — 99214 OFFICE O/P EST MOD 30 MIN: CPT | Performed by: FAMILY MEDICINE

## 2021-04-19 RX ORDER — BUPROPION HYDROCHLORIDE 150 MG/1
150 TABLET ORAL DAILY
Qty: 30 TABLET | Refills: 0 | Status: SHIPPED | OUTPATIENT
Start: 2021-04-19 | End: 2021-05-17

## 2021-04-19 RX ORDER — TOPIRAMATE 100 MG/1
TABLET, FILM COATED ORAL
Qty: 60 TABLET | Refills: 2 | OUTPATIENT
Start: 2021-04-19

## 2021-04-19 RX ORDER — TRAMADOL HYDROCHLORIDE 50 MG/1
50 TABLET ORAL EVERY 6 HOURS PRN
Qty: 120 TABLET | Refills: 0 | Status: SHIPPED | OUTPATIENT
Start: 2021-04-19 | End: 2021-10-08

## 2021-04-19 RX ORDER — CYANOCOBALAMIN 1000 UG/ML
1000 INJECTION, SOLUTION INTRAMUSCULAR; SUBCUTANEOUS
Qty: 10 ML | Refills: 1 | Status: SHIPPED | OUTPATIENT
Start: 2021-04-19

## 2021-04-19 RX ORDER — TOPIRAMATE 100 MG/1
1 CAPSULE, EXTENDED RELEASE ORAL DAILY
Qty: 90 CAPSULE | Refills: 1 | Status: SHIPPED | OUTPATIENT
Start: 2021-04-19 | End: 2021-10-25

## 2021-04-19 RX ORDER — BUSPIRONE HYDROCHLORIDE 7.5 MG/1
7.5 TABLET ORAL 3 TIMES DAILY
Qty: 90 TABLET | Refills: 3 | Status: SHIPPED | OUTPATIENT
Start: 2021-04-19 | End: 2021-10-08

## 2021-04-19 RX ORDER — FLUTICASONE PROPIONATE 50 MCG
2 SPRAY, SUSPENSION (ML) NASAL DAILY
Qty: 18 ML | Refills: 5 | Status: SHIPPED | OUTPATIENT
Start: 2021-04-19

## 2021-04-19 NOTE — PROGRESS NOTES
"Chief Complaint  Med Refill (Pt is here for refills on migraine medication. She says current medication isn't helping and wants to change it. She is also needing refills on all medications  )    Subjective          Serenity Colbert presents to [facility]  History of Present Illness  PT is here for refills and  Constipation stable and needs refills  Migraines have been worse lately bc out of meds and needs refills.  Chronic pain and fibromyalgia need refills and is stable  Anxiety and depression- stable and no HI or SI.  She wants to try buspar instead of hydroxyzine.    Objective   Vital Signs:   /88   Pulse 88   Temp 97.9 °F (36.6 °C)   Ht 165.1 cm (65\")   Wt 76.1 kg (167 lb 12.8 oz)   SpO2 100%   BMI 27.92 kg/m²     Physical Exam  Vitals and nursing note reviewed.   Constitutional:       Appearance: Normal appearance.   Cardiovascular:      Rate and Rhythm: Normal rate and regular rhythm.      Heart sounds: Normal heart sounds.   Pulmonary:      Effort: Pulmonary effort is normal. No respiratory distress.      Breath sounds: Normal breath sounds. No stridor. No wheezing or rhonchi.   Neurological:      Mental Status: She is alert.        Result Review :                 Assessment and Plan    Diagnoses and all orders for this visit:    1. Other constipation (Primary)  -     linaclotide (Linzess) 145 MCG capsule capsule; Take 1 capsule by mouth Every Morning Before Breakfast.  Dispense: 30 capsule; Refill: 5  -     Topiramate ER (Trokendi XR) 100 MG capsule sustained-release 24 hr; Take 1 capsule by mouth Daily.  Dispense: 90 capsule; Refill: 1    2. Fibromyalgia  -     traMADol (ULTRAM) 50 MG tablet; Take 1 tablet by mouth Every 6 (Six) Hours As Needed for Moderate Pain .  Dispense: 120 tablet; Refill: 0    3. Other chronic pain  -     traMADol (ULTRAM) 50 MG tablet; Take 1 tablet by mouth Every 6 (Six) Hours As Needed for Moderate Pain .  Dispense: 120 tablet; Refill: " 0    4. Allergic rhinitis due to pollen, unspecified seasonality  -     fluticasone (Flonase) 50 MCG/ACT nasal spray; 2 sprays into the nostril(s) as directed by provider Daily.  Dispense: 18 mL; Refill: 5    5. Anxiety and depression  -     busPIRone (BUSPAR) 7.5 MG tablet; Take 1 tablet by mouth 3 (Three) Times a Day.  Dispense: 90 tablet; Refill: 3  -     buPROPion XL (WELLBUTRIN XL) 150 MG 24 hr tablet; Take 1 tablet by mouth Daily.  Dispense: 30 tablet; Refill: 0    6. B12 deficiency  -     cyanocobalamin 1000 MCG/ML injection; Inject 1 mL into the appropriate muscle as directed by prescriber Every 28 (Twenty-Eight) Days.  Dispense: 10 mL; Refill: 1    7. Migraine with aura and without status migrainosus, not intractable  -     Topiramate ER (Trokendi XR) 100 MG capsule sustained-release 24 hr; Take 1 capsule by mouth Daily.  Dispense: 90 capsule; Refill: 1  -     Vitamin B12    8. Vitamin D deficiency  -     Vitamin D 25 Hydroxy    9. Chronic fatigue  -     CBC & Differential  -     TSH Rfx On Abnormal To Free T4    10. Screening for cardiovascular condition  -     Comprehensive Metabolic Panel  -     Lipid Panel        Follow Up   Return in about 3 months (around 7/19/2021).  Patient was given instructions and counseling regarding her condition or for health maintenance advice. Please see specific information pulled into the AVS if appropriate.     Refills, labs and she will be following up with gyn.

## 2021-04-20 DIAGNOSIS — E55.9 VITAMIN D DEFICIENCY: Primary | ICD-10-CM

## 2021-04-20 LAB
25(OH)D3+25(OH)D2 SERPL-MCNC: 20.5 NG/ML (ref 30–100)
ALBUMIN SERPL-MCNC: 4.5 G/DL (ref 3.5–5.2)
ALBUMIN/GLOB SERPL: 1.8 G/DL
ALP SERPL-CCNC: 96 U/L (ref 39–117)
ALT SERPL-CCNC: 18 U/L (ref 1–33)
AST SERPL-CCNC: 17 U/L (ref 1–32)
BASOPHILS # BLD AUTO: 0.05 10*3/MM3 (ref 0–0.2)
BASOPHILS NFR BLD AUTO: 0.5 % (ref 0–1.5)
BILIRUB SERPL-MCNC: 0.3 MG/DL (ref 0–1.2)
BUN SERPL-MCNC: 7 MG/DL (ref 6–20)
BUN/CREAT SERPL: 10.8 (ref 7–25)
CALCIUM SERPL-MCNC: 9.5 MG/DL (ref 8.6–10.5)
CHLORIDE SERPL-SCNC: 106 MMOL/L (ref 98–107)
CHOLEST SERPL-MCNC: 186 MG/DL (ref 0–200)
CO2 SERPL-SCNC: 25.9 MMOL/L (ref 22–29)
CREAT SERPL-MCNC: 0.65 MG/DL (ref 0.57–1)
EOSINOPHIL # BLD AUTO: 0.2 10*3/MM3 (ref 0–0.4)
EOSINOPHIL NFR BLD AUTO: 2.1 % (ref 0.3–6.2)
ERYTHROCYTE [DISTWIDTH] IN BLOOD BY AUTOMATED COUNT: 13.5 % (ref 12.3–15.4)
GLOBULIN SER CALC-MCNC: 2.5 GM/DL
GLUCOSE SERPL-MCNC: 82 MG/DL (ref 65–99)
HCT VFR BLD AUTO: 44.6 % (ref 34–46.6)
HDLC SERPL-MCNC: 69 MG/DL (ref 40–60)
HGB BLD-MCNC: 14.6 G/DL (ref 12–15.9)
IMM GRANULOCYTES # BLD AUTO: 0.08 10*3/MM3 (ref 0–0.05)
IMM GRANULOCYTES NFR BLD AUTO: 0.8 % (ref 0–0.5)
LDLC SERPL CALC-MCNC: 104 MG/DL (ref 0–100)
LYMPHOCYTES # BLD AUTO: 1.56 10*3/MM3 (ref 0.7–3.1)
LYMPHOCYTES NFR BLD AUTO: 16.4 % (ref 19.6–45.3)
MCH RBC QN AUTO: 28.1 PG (ref 26.6–33)
MCHC RBC AUTO-ENTMCNC: 32.7 G/DL (ref 31.5–35.7)
MCV RBC AUTO: 85.8 FL (ref 79–97)
MONOCYTES # BLD AUTO: 0.6 10*3/MM3 (ref 0.1–0.9)
MONOCYTES NFR BLD AUTO: 6.3 % (ref 5–12)
NEUTROPHILS # BLD AUTO: 7.03 10*3/MM3 (ref 1.7–7)
NEUTROPHILS NFR BLD AUTO: 73.9 % (ref 42.7–76)
NRBC BLD AUTO-RTO: 0 /100 WBC (ref 0–0.2)
PLATELET # BLD AUTO: 339 10*3/MM3 (ref 140–450)
POTASSIUM SERPL-SCNC: 4.3 MMOL/L (ref 3.5–5.2)
PROT SERPL-MCNC: 7 G/DL (ref 6–8.5)
RBC # BLD AUTO: 5.2 10*6/MM3 (ref 3.77–5.28)
SODIUM SERPL-SCNC: 140 MMOL/L (ref 136–145)
TRIGL SERPL-MCNC: 73 MG/DL (ref 0–150)
TSH SERPL DL<=0.005 MIU/L-ACNC: 2.84 UIU/ML (ref 0.27–4.2)
VIT B12 SERPL-MCNC: 563 PG/ML (ref 211–946)
VLDLC SERPL CALC-MCNC: 13 MG/DL (ref 5–40)
WBC # BLD AUTO: 9.52 10*3/MM3 (ref 3.4–10.8)

## 2021-04-20 RX ORDER — ERGOCALCIFEROL 1.25 MG/1
50000 CAPSULE ORAL WEEKLY
Qty: 5 CAPSULE | Refills: 5 | Status: SHIPPED | OUTPATIENT
Start: 2021-04-20

## 2021-05-16 DIAGNOSIS — F41.9 ANXIETY AND DEPRESSION: ICD-10-CM

## 2021-05-16 DIAGNOSIS — F32.A ANXIETY AND DEPRESSION: ICD-10-CM

## 2021-05-17 RX ORDER — BUPROPION HYDROCHLORIDE 150 MG/1
150 TABLET ORAL DAILY
Qty: 30 TABLET | Refills: 3 | Status: SHIPPED | OUTPATIENT
Start: 2021-05-17 | End: 2022-08-16 | Stop reason: SDUPTHER

## 2021-05-17 RX ORDER — FLUCONAZOLE 150 MG/1
150 TABLET ORAL ONCE
Qty: 1 TABLET | Refills: 0 | Status: SHIPPED | OUTPATIENT
Start: 2021-05-17 | End: 2021-05-17

## 2021-08-17 RX ORDER — FLUCONAZOLE 150 MG/1
150 TABLET ORAL ONCE
Qty: 1 TABLET | Refills: 0 | Status: SHIPPED | OUTPATIENT
Start: 2021-08-17 | End: 2021-08-17

## 2021-08-17 RX ORDER — METRONIDAZOLE 500 MG/1
500 TABLET ORAL 2 TIMES DAILY
Qty: 14 TABLET | Refills: 0 | Status: SHIPPED | OUTPATIENT
Start: 2021-08-17 | End: 2022-08-24 | Stop reason: SDUPTHER

## 2021-10-08 ENCOUNTER — TELEMEDICINE (OUTPATIENT)
Dept: FAMILY MEDICINE CLINIC | Facility: CLINIC | Age: 41
End: 2021-10-08

## 2021-10-08 DIAGNOSIS — M79.7 FIBROMYALGIA: ICD-10-CM

## 2021-10-08 DIAGNOSIS — G43.001 MIGRAINE WITHOUT AURA AND WITH STATUS MIGRAINOSUS, NOT INTRACTABLE: Primary | ICD-10-CM

## 2021-10-08 DIAGNOSIS — G89.29 OTHER CHRONIC PAIN: ICD-10-CM

## 2021-10-08 PROCEDURE — 99213 OFFICE O/P EST LOW 20 MIN: CPT | Performed by: FAMILY MEDICINE

## 2021-10-08 RX ORDER — SUMATRIPTAN 25 MG/1
TABLET, FILM COATED ORAL
Qty: 9 TABLET | Refills: 3 | Status: SHIPPED | OUTPATIENT
Start: 2021-10-08

## 2021-10-08 RX ORDER — SUMATRIPTAN 25 MG/1
TABLET, FILM COATED ORAL
Qty: 9 TABLET | Refills: 3 | Status: SHIPPED | OUTPATIENT
Start: 2021-10-08 | End: 2021-10-08

## 2021-10-08 RX ORDER — TRAMADOL HYDROCHLORIDE 50 MG/1
50 TABLET ORAL EVERY 6 HOURS PRN
Qty: 120 TABLET | Refills: 0 | Status: SHIPPED | OUTPATIENT
Start: 2021-10-08 | End: 2022-01-21

## 2021-10-08 NOTE — PROGRESS NOTES
Chief Complaint  No chief complaint on file.  Chills  Subjective          Serenity Colbert presents to [facility]  History of Present Illness  Patient agreed to be contacted by [facility]  Went to ER and was told has a Virus , severe H/A  X a week, frontal, , nausea, body aches, extreme lethargy , no respiratory symptoms, had COVID and Flu test negative Saturday and Monday, on Zofran, emesis will not stop went to ER had IV fluids, and toradol , still nausea, headache every where, had Migraines before , needs note to off  since Tuesday   Objective   Vital Signs:   There were no vitals taken for this visit.      Result Review :                 Assessment and Plan    Diagnoses and all orders for this visit:    1. Migraine without aura and with status migrainosus, not intractable (Primary)  -     SUMAtriptan (Imitrex) 25 MG tablet; Take one tablet at onset of headache. May repeat dose one time in 2 hours if headache not relieved.  Dispense: 9 tablet; Refill: 3        Follow Up   No follow-ups on file.  Patient was given instructions and counseling regarding her condition or for health maintenance advice. Please see specific information pulled into the AVS if appropriate.     Time spent 20 minutes, if no better to go to ER

## 2021-10-25 DIAGNOSIS — K59.09 OTHER CONSTIPATION: ICD-10-CM

## 2021-10-25 DIAGNOSIS — G43.109 MIGRAINE WITH AURA AND WITHOUT STATUS MIGRAINOSUS, NOT INTRACTABLE: ICD-10-CM

## 2021-10-25 RX ORDER — TOPIRAMATE 100 MG/1
1 CAPSULE, EXTENDED RELEASE ORAL DAILY
Qty: 90 CAPSULE | Refills: 1 | Status: SHIPPED | OUTPATIENT
Start: 2021-10-25 | End: 2022-05-05

## 2021-11-05 DIAGNOSIS — G43.109 MIGRAINE WITH AURA AND WITHOUT STATUS MIGRAINOSUS, NOT INTRACTABLE: ICD-10-CM

## 2021-11-05 DIAGNOSIS — R11.0 NAUSEA: ICD-10-CM

## 2021-11-05 RX ORDER — ONDANSETRON 4 MG/1
4 TABLET, FILM COATED ORAL EVERY 8 HOURS PRN
Qty: 60 TABLET | Refills: 0 | Status: SHIPPED | OUTPATIENT
Start: 2021-11-05 | End: 2022-08-16 | Stop reason: SDUPTHER

## 2022-01-21 DIAGNOSIS — M79.7 FIBROMYALGIA: ICD-10-CM

## 2022-01-21 DIAGNOSIS — G89.29 OTHER CHRONIC PAIN: ICD-10-CM

## 2022-01-21 RX ORDER — TRAMADOL HYDROCHLORIDE 50 MG/1
TABLET ORAL
Qty: 120 TABLET | Refills: 0 | Status: SHIPPED | OUTPATIENT
Start: 2022-01-21 | End: 2022-01-24 | Stop reason: SDUPTHER

## 2022-01-21 NOTE — TELEPHONE ENCOUNTER
Rx Refill Note  Requested Prescriptions     Pending Prescriptions Disp Refills   • traMADol (ULTRAM) 50 MG tablet [Pharmacy Med Name: TRAMADOL 50MG TABLETS] 120 tablet      Sig: TAKE 1 TABLET BY MOUTH EVERY 6 HOURS AS NEEDED FOR MODERATE PAIN      Last office visit with prescribing clinician: 10/8/2021      Next office visit with prescribing clinician: Visit date not found            Cade Saab MA  01/21/22, 13:04 EST       Spoke with patient and informed informed due for follow up.  Apt made.

## 2022-01-24 ENCOUNTER — TELEMEDICINE (OUTPATIENT)
Dept: FAMILY MEDICINE CLINIC | Facility: CLINIC | Age: 42
End: 2022-01-24

## 2022-01-24 DIAGNOSIS — F41.9 ANXIETY: Primary | ICD-10-CM

## 2022-01-24 DIAGNOSIS — M79.7 FIBROMYALGIA: ICD-10-CM

## 2022-01-24 DIAGNOSIS — G89.29 OTHER CHRONIC PAIN: ICD-10-CM

## 2022-01-24 PROCEDURE — 99214 OFFICE O/P EST MOD 30 MIN: CPT | Performed by: FAMILY MEDICINE

## 2022-01-24 RX ORDER — VORTIOXETINE 5 MG/1
5 TABLET, FILM COATED ORAL
Qty: 30 TABLET | Refills: 3 | Status: SHIPPED | OUTPATIENT
Start: 2022-01-24 | End: 2022-04-08

## 2022-01-24 RX ORDER — TRAMADOL HYDROCHLORIDE 50 MG/1
50 TABLET ORAL EVERY 6 HOURS PRN
Qty: 120 TABLET | Refills: 1 | Status: SHIPPED | OUTPATIENT
Start: 2022-01-24 | End: 2022-08-04 | Stop reason: SDUPTHER

## 2022-01-24 NOTE — PROGRESS NOTES
CC:  fibromyalgia    Subjective   Serenity Colbert is a 41 y.o. female.     History of Present Illness   The patient presents today for follow-up via a video visit due to the COVID-19 pandemic for  Fibromyalgia- stable with med and need refills.  She is functioning well with the medication and has no somnolence or confusion.  Anxiety- need something for this.  buspar and hyrdoxyzine not helping.  Is asking about trintellix.  No HI or SI.  Tried fluoxatine, lexapro, celexa, cymbalta          The following portions of the patient's history were reviewed and updated as appropriate: allergies, current medications, past family history, past medical history, past social history, past surgical history and problem list.    Review of Systems    Patient Active Problem List   Diagnosis   • Migraine with aura   • Lyme disease   • Ganglion cyst   • Fibromyalgia   • Atrial septal defect   • Vitamin D deficiency   • Migraine without aura and with status migrainosus, not intractable   • Anxiety       Allergies   Allergen Reactions   • Peanut-Containing Drug Products Anaphylaxis   • Heparin Other (See Comments)     BP dropped    • Ibuprofen Other (See Comments)     Stomach bleeding   • Nsaids          Current Outpatient Medications:   •  buPROPion XL (WELLBUTRIN XL) 150 MG 24 hr tablet, TAKE 1 TABLET BY MOUTH DAILY, Disp: 30 tablet, Rfl: 3  •  cyanocobalamin 1000 MCG/ML injection, Inject 1 mL into the appropriate muscle as directed by prescriber Every 28 (Twenty-Eight) Days., Disp: 10 mL, Rfl: 1  •  fluticasone (Flonase) 50 MCG/ACT nasal spray, 2 sprays into the nostril(s) as directed by provider Daily., Disp: 18 mL, Rfl: 5  •  hydrOXYzine (ATARAX) 25 MG tablet, Take 1 tablet by mouth every night at bedtime., Disp: 90 tablet, Rfl: 1  •  linaclotide (Linzess) 145 MCG capsule capsule, Take 1 capsule by mouth Every Morning Before Breakfast., Disp: 30 capsule, Rfl: 5  •  metroNIDAZOLE (Flagyl) 500 MG tablet, Take 1 tablet by mouth 2  "(Two) Times a Day., Disp: 14 tablet, Rfl: 0  •  ondansetron (ZOFRAN) 4 MG tablet, Take 1 tablet by mouth Every 8 (Eight) Hours As Needed for Nausea or Vomiting., Disp: 60 tablet, Rfl: 0  •  SUMAtriptan (Imitrex) 25 MG tablet, Take one tablet at onset of headache. May repeat dose one time in 2 hours if headache not relieved.Max 2 tabs in 24 hrs, Disp: 9 tablet, Rfl: 3  •  Syringe/Needle, Disp, (BD ECLIPSE SYRINGE) 25G X 1\" 3 ML misc, USE AS DIRECTED FOR B12 INJECTION, Disp: 100 each, Rfl: 11  •  traMADol (ULTRAM) 50 MG tablet, Take 1 tablet by mouth Every 6 (Six) Hours As Needed for Moderate Pain ., Disp: 120 tablet, Rfl: 1  •  Trokendi  MG capsule sustained-release 24 hr, TAKE 1 CAPSULE BY MOUTH DAILY, Disp: 90 capsule, Rfl: 1  •  vitamin D (ERGOCALCIFEROL) 1.25 MG (25560 UT) capsule capsule, Take 1 capsule by mouth 1 (One) Time Per Week., Disp: 5 capsule, Rfl: 5  •  Vortioxetine HBr (Trintellix) 5 MG tablet, Take 5 mg by mouth Daily With Breakfast., Disp: 30 tablet, Rfl: 3    Past Medical History:   Diagnosis Date   • GUIDO positive    • Atrial septal defect     as a child. -repared   • Chest pain    • Fibromyalgia    • Ganglion cyst    • Gastric ulcer    • Lyme disease    • Memory deficit    • Migraine with aura    • Osteoarthritis    • Plantar fasciitis    • Therapeutic opioid induced constipation    • Vitamin B12 deficiency    • Vitamin D deficiency        Past Surgical History:   Procedure Laterality Date   • CARDIAC SURGERY      open heart for defect repair of VSD   • GALLBLADDER SURGERY  2007   • GANGLION CYST EXCISION         Family History   Problem Relation Age of Onset   • Breast cancer Mother 49   • Heart attack Father    • Diabetes Father    • Hyperlipidemia Father    • Hypertension Father    • Leukemia Paternal Grandmother    • Colon cancer Maternal Aunt    • Hyperlipidemia Brother    • Diabetes Maternal Grandmother    • Asthma Maternal Grandmother    • COPD Maternal Grandmother    • Stroke " Maternal Grandfather    • Diabetes Maternal Grandfather    • Diabetes Paternal Grandfather    • Heart disease Other         FH of heart disease in males before age 55       Social History     Tobacco Use   • Smoking status: Never Smoker   • Smokeless tobacco: Never Used   Substance Use Topics   • Alcohol use: Yes     Comment: Rare            Objective     There were no vitals taken for this visit. Video Visit    Physical Exam  NAD  AAOx3  No labored breathing.  EOMI   PERRLA      Lab Results   Component Value Date    GLUCOSE 82 04/19/2021    BUN 7 04/19/2021    CREATININE 0.65 04/19/2021    EGFRIFNONA 101 04/19/2021    EGFRIFAFRI 122 04/19/2021    BCR 10.8 04/19/2021    K 4.3 04/19/2021    CO2 25.9 04/19/2021    CALCIUM 9.5 04/19/2021    PROTENTOTREF 7.0 04/19/2021    ALBUMIN 4.50 04/19/2021    LABIL2 1.8 04/19/2021    AST 17 04/19/2021    ALT 18 04/19/2021       WBC   Date Value Ref Range Status   10/03/2021 11.20 (H) 4.5 - 11.0 10*3/uL Final     RBC   Date Value Ref Range Status   10/03/2021 5.01 4.0 - 5.2 10*6/uL Final     Hemoglobin   Date Value Ref Range Status   10/03/2021 14.1 12.0 - 16.0 g/dL Final     Hematocrit   Date Value Ref Range Status   10/03/2021 42.7 36.0 - 46.0 % Final     MCV   Date Value Ref Range Status   10/03/2021 85.2 80.0 - 100.0 fL Final     MCH   Date Value Ref Range Status   10/03/2021 28.1 26.0 - 34.0 pg Final     MCHC   Date Value Ref Range Status   10/03/2021 33.0 31.0 - 37.0 g/dL Final     RDW   Date Value Ref Range Status   10/03/2021 12.6 12.0 - 16.8 % Final     MPV   Date Value Ref Range Status   10/03/2021 9.9 8.4 - 12.4 fL Final     Platelets   Date Value Ref Range Status   10/03/2021 268 140 - 440 10*3/uL Final     Neutrophil Rel %   Date Value Ref Range Status   10/03/2021 84.5 (H) 45 - 80 % Final     Lymphocyte Rel %   Date Value Ref Range Status   10/03/2021 9.5 (L) 15 - 50 % Final     Monocyte Rel %   Date Value Ref Range Status   10/03/2021 4.5 0 - 15 % Final      Eosinophil %   Date Value Ref Range Status   10/03/2021 0.6 0 - 7 % Final     Basophil Rel %   Date Value Ref Range Status   10/03/2021 0.4 0 - 2 % Final     Immature Grans %   Date Value Ref Range Status   10/03/2021 0.5 0.0 - 1.0 % Final     Neutrophils Absolute   Date Value Ref Range Status   10/03/2021 9.47 (H) 2.0 - 8.8 10*3/uL Final     Lymphocytes Absolute   Date Value Ref Range Status   10/03/2021 1.06 0.7 - 5.5 10*3/uL Final     Monocytes Absolute   Date Value Ref Range Status   10/03/2021 0.50 0.0 - 1.7 10*3/uL Final     Eosinophils Absolute   Date Value Ref Range Status   10/03/2021 0.07 0.0 - 0.8 10*3/uL Final     Basophils Absolute   Date Value Ref Range Status   10/03/2021 0.04 0.0 - 0.2 10*3/uL Final     Immature Grans, Absolute   Date Value Ref Range Status   10/03/2021 0.06 0.00 - 0.10 10*3/uL Final     nRBC   Date Value Ref Range Status   10/03/2021 0 0 /100(WBC) Final       No results found for: HGBA1C    Lab Results   Component Value Date    [id number] 563 04/19/2021       TSH   Date Value Ref Range Status   04/19/2021 2.840 0.270 - 4.200 uIU/mL Final       No results found for: CHOL  Lab Results   Component Value Date    TRIG 73 04/19/2021     Lab Results   Component Value Date    HDL 69 (H) 04/19/2021     Lab Results   Component Value Date     (H) 04/19/2021     Lab Results   Component Value Date    VLDL 13 04/19/2021     No results found for: LDLHDL      Procedures    Assessment/Plan   Problems Addressed this Visit     Fibromyalgia    Relevant Medications    traMADol (ULTRAM) 50 MG tablet    Anxiety - Primary    Relevant Medications    Vortioxetine HBr (Trintellix) 5 MG tablet      Other Visit Diagnoses     Other chronic pain        Relevant Medications    traMADol (ULTRAM) 50 MG tablet      Diagnoses       Codes Comments    Anxiety    -  Primary ICD-10-CM: F41.9  ICD-9-CM: 300.00     Fibromyalgia     ICD-10-CM: M79.7  ICD-9-CM: 729.1     Other chronic pain     ICD-10-CM:  "G89.29  ICD-9-CM: 338.29           No orders of the defined types were placed in this encounter.      Current Outpatient Medications   Medication Sig Dispense Refill   • buPROPion XL (WELLBUTRIN XL) 150 MG 24 hr tablet TAKE 1 TABLET BY MOUTH DAILY 30 tablet 3   • cyanocobalamin 1000 MCG/ML injection Inject 1 mL into the appropriate muscle as directed by prescriber Every 28 (Twenty-Eight) Days. 10 mL 1   • fluticasone (Flonase) 50 MCG/ACT nasal spray 2 sprays into the nostril(s) as directed by provider Daily. 18 mL 5   • hydrOXYzine (ATARAX) 25 MG tablet Take 1 tablet by mouth every night at bedtime. 90 tablet 1   • linaclotide (Linzess) 145 MCG capsule capsule Take 1 capsule by mouth Every Morning Before Breakfast. 30 capsule 5   • metroNIDAZOLE (Flagyl) 500 MG tablet Take 1 tablet by mouth 2 (Two) Times a Day. 14 tablet 0   • ondansetron (ZOFRAN) 4 MG tablet Take 1 tablet by mouth Every 8 (Eight) Hours As Needed for Nausea or Vomiting. 60 tablet 0   • SUMAtriptan (Imitrex) 25 MG tablet Take one tablet at onset of headache. May repeat dose one time in 2 hours if headache not relieved.Max 2 tabs in 24 hrs 9 tablet 3   • Syringe/Needle, Disp, (BD ECLIPSE SYRINGE) 25G X 1\" 3 ML misc USE AS DIRECTED FOR B12 INJECTION 100 each 11   • traMADol (ULTRAM) 50 MG tablet Take 1 tablet by mouth Every 6 (Six) Hours As Needed for Moderate Pain . 120 tablet 1   • Trokendi  MG capsule sustained-release 24 hr TAKE 1 CAPSULE BY MOUTH DAILY 90 capsule 1   • vitamin D (ERGOCALCIFEROL) 1.25 MG (35757 UT) capsule capsule Take 1 capsule by mouth 1 (One) Time Per Week. 5 capsule 5   • Vortioxetine HBr (Trintellix) 5 MG tablet Take 5 mg by mouth Daily With Breakfast. 30 tablet 3     No current facility-administered medications for this visit.       Return in about 3 weeks (around 2/14/2022).    There are no Patient Instructions on file for this visit.         Time spent on visit:  14   minutes.  This patient has consented to a " telehealth visit via video. The visit was scheduled as a video visit to comply with patient safety concerns in accordance with CDC recommendations.  All vitals recorded within this visit are reported by the patient.        ANDRES RUN  and reviewed on Epic.  Risks of the medication include but are not limited to fatigue, somnolence, increased risk of falls, constipation, allergic reaction, dependence, and addiction.  Also, emphasized not taking any illicit substances.  Patient is aware and acknowledges information given. Medication refilled.      Refills today. Start trintellix and SE discussed.

## 2022-04-08 DIAGNOSIS — F51.01 PRIMARY INSOMNIA: ICD-10-CM

## 2022-04-08 DIAGNOSIS — F41.9 ANXIETY: Primary | ICD-10-CM

## 2022-04-08 RX ORDER — HYDROXYZINE 50 MG/1
50 TABLET, FILM COATED ORAL NIGHTLY PRN
Qty: 30 TABLET | Refills: 5 | Status: SHIPPED | OUTPATIENT
Start: 2022-04-08

## 2022-04-19 ENCOUNTER — TELEMEDICINE (OUTPATIENT)
Dept: FAMILY MEDICINE CLINIC | Facility: CLINIC | Age: 42
End: 2022-04-19

## 2022-04-19 DIAGNOSIS — G89.29 CHRONIC BILATERAL LOW BACK PAIN WITH RIGHT-SIDED SCIATICA: ICD-10-CM

## 2022-04-19 DIAGNOSIS — M79.7 FIBROMYALGIA: Primary | ICD-10-CM

## 2022-04-19 DIAGNOSIS — M54.41 CHRONIC BILATERAL LOW BACK PAIN WITH RIGHT-SIDED SCIATICA: ICD-10-CM

## 2022-04-19 PROCEDURE — 99213 OFFICE O/P EST LOW 20 MIN: CPT | Performed by: FAMILY MEDICINE

## 2022-04-19 NOTE — PROGRESS NOTES
CC: back pain    Subjective   Serenity Colbert is a 41 y.o. female.     History of Present Illness   The patient presents today for follow-up via a video visit due to the COVID-19 pandemic for    Past week week or so has been having low back pain.  She went to [facility] last year for this and was told she had some mild DDD and spondylosis and osteophytes.  She went to chiropractor on Friday.  She has been using ice.  She has trouble bending and twisting and moving around.  No recent hx of injury.  She has some radiation of the pain on right thigh.  No bowel or urine dysfunction.  Pain has been getting worse over weekend.    She is interested in intermittent FMLA for this as well as fibromyalgia.    Once a week lasting 1-2 days per episode.        The following portions of the patient's history were reviewed and updated as appropriate: allergies, current medications, past family history, past medical history, past social history, past surgical history and problem list.    Review of Systems    Patient Active Problem List   Diagnosis   • Migraine with aura   • Lyme disease   • Ganglion cyst   • Fibromyalgia   • Atrial septal defect   • Vitamin D deficiency   • Migraine without aura and with status migrainosus, not intractable   • Anxiety   • Primary insomnia   • Chronic bilateral low back pain with right-sided sciatica       Allergies   Allergen Reactions   • Peanut-Containing Drug Products Anaphylaxis   • Heparin Other (See Comments)     BP dropped    • Ibuprofen Other (See Comments)     Stomach bleeding   • Nsaids          Current Outpatient Medications:   •  buPROPion XL (WELLBUTRIN XL) 150 MG 24 hr tablet, TAKE 1 TABLET BY MOUTH DAILY, Disp: 30 tablet, Rfl: 3  •  cyanocobalamin 1000 MCG/ML injection, Inject 1 mL into the appropriate muscle as directed by prescriber Every 28 (Twenty-Eight) Days., Disp: 10 mL, Rfl: 1  •  fluticasone (Flonase) 50 MCG/ACT nasal spray, 2 sprays into the nostril(s) as directed by provider  "Daily., Disp: 18 mL, Rfl: 5  •  hydrOXYzine (ATARAX) 50 MG tablet, Take 1 tablet by mouth At Night As Needed (sleep and anxiety)., Disp: 30 tablet, Rfl: 5  •  linaclotide (Linzess) 145 MCG capsule capsule, Take 1 capsule by mouth Every Morning Before Breakfast., Disp: 30 capsule, Rfl: 5  •  metroNIDAZOLE (Flagyl) 500 MG tablet, Take 1 tablet by mouth 2 (Two) Times a Day., Disp: 14 tablet, Rfl: 0  •  ondansetron (ZOFRAN) 4 MG tablet, Take 1 tablet by mouth Every 8 (Eight) Hours As Needed for Nausea or Vomiting., Disp: 60 tablet, Rfl: 0  •  SUMAtriptan (Imitrex) 25 MG tablet, Take one tablet at onset of headache. May repeat dose one time in 2 hours if headache not relieved.Max 2 tabs in 24 hrs, Disp: 9 tablet, Rfl: 3  •  Syringe/Needle, Disp, (BD ECLIPSE SYRINGE) 25G X 1\" 3 ML misc, USE AS DIRECTED FOR B12 INJECTION, Disp: 100 each, Rfl: 11  •  traMADol (ULTRAM) 50 MG tablet, Take 1 tablet by mouth Every 6 (Six) Hours As Needed for Moderate Pain ., Disp: 120 tablet, Rfl: 1  •  Trokendi  MG capsule sustained-release 24 hr, TAKE 1 CAPSULE BY MOUTH DAILY, Disp: 90 capsule, Rfl: 1  •  vitamin D (ERGOCALCIFEROL) 1.25 MG (33056 UT) capsule capsule, Take 1 capsule by mouth 1 (One) Time Per Week., Disp: 5 capsule, Rfl: 5    Past Medical History:   Diagnosis Date   • GUIDO positive    • Atrial septal defect     as a child. -repared   • Chest pain    • Fibromyalgia    • Ganglion cyst    • Gastric ulcer    • Lyme disease    • Memory deficit    • Migraine with aura    • Osteoarthritis    • Plantar fasciitis    • Therapeutic opioid induced constipation    • Vitamin B12 deficiency    • Vitamin D deficiency        Past Surgical History:   Procedure Laterality Date   • CARDIAC SURGERY      open heart for defect repair of VSD   • GALLBLADDER SURGERY  2007   • GANGLION CYST EXCISION         Family History   Problem Relation Age of Onset   • Breast cancer Mother 49   • Heart attack Father    • Diabetes Father    • Hyperlipidemia " Father    • Hypertension Father    • Leukemia Paternal Grandmother    • Colon cancer Maternal Aunt    • Hyperlipidemia Brother    • Diabetes Maternal Grandmother    • Asthma Maternal Grandmother    • COPD Maternal Grandmother    • Stroke Maternal Grandfather    • Diabetes Maternal Grandfather    • Diabetes Paternal Grandfather    • Heart disease Other         FH of heart disease in males before age 55       Social History     Tobacco Use   • Smoking status: Never Smoker   • Smokeless tobacco: Never Used   Substance Use Topics   • Alcohol use: Yes     Comment: Rare            Objective     There were no vitals taken for this visit. Video Visit    Physical Exam  NAD  AAOx3  No labored breathing.    Lab Results   Component Value Date    GLUCOSE 82 04/19/2021    BUN 7 04/19/2021    CREATININE 0.65 04/19/2021    EGFRIFNONA 101 04/19/2021    EGFRIFAFRI 122 04/19/2021    BCR 10.8 04/19/2021    K 4.3 04/19/2021    CO2 25.9 04/19/2021    CALCIUM 9.5 04/19/2021    PROTENTOTREF 7.0 04/19/2021    ALBUMIN 4.50 04/19/2021    LABIL2 1.8 04/19/2021    AST 17 04/19/2021    ALT 18 04/19/2021       WBC   Date Value Ref Range Status   10/03/2021 11.20 (H) 4.5 - 11.0 10*3/uL Final     RBC   Date Value Ref Range Status   10/03/2021 5.01 4.0 - 5.2 10*6/uL Final     Hemoglobin   Date Value Ref Range Status   10/03/2021 14.1 12.0 - 16.0 g/dL Final     Hematocrit   Date Value Ref Range Status   10/03/2021 42.7 36.0 - 46.0 % Final     MCV   Date Value Ref Range Status   10/03/2021 85.2 80.0 - 100.0 fL Final     MCH   Date Value Ref Range Status   10/03/2021 28.1 26.0 - 34.0 pg Final     MCHC   Date Value Ref Range Status   10/03/2021 33.0 31.0 - 37.0 g/dL Final     RDW   Date Value Ref Range Status   10/03/2021 12.6 12.0 - 16.8 % Final     MPV   Date Value Ref Range Status   10/03/2021 9.9 8.4 - 12.4 fL Final     Platelets   Date Value Ref Range Status   10/03/2021 268 140 - 440 10*3/uL Final     Neutrophil Rel %   Date Value Ref Range Status    10/03/2021 84.5 (H) 45 - 80 % Final     Lymphocyte Rel %   Date Value Ref Range Status   10/03/2021 9.5 (L) 15 - 50 % Final     Monocyte Rel %   Date Value Ref Range Status   10/03/2021 4.5 0 - 15 % Final     Eosinophil %   Date Value Ref Range Status   10/03/2021 0.6 0 - 7 % Final     Basophil Rel %   Date Value Ref Range Status   10/03/2021 0.4 0 - 2 % Final     Immature Grans %   Date Value Ref Range Status   10/03/2021 0.5 0.0 - 1.0 % Final     Neutrophils Absolute   Date Value Ref Range Status   10/03/2021 9.47 (H) 2.0 - 8.8 10*3/uL Final     Lymphocytes Absolute   Date Value Ref Range Status   10/03/2021 1.06 0.7 - 5.5 10*3/uL Final     Monocytes Absolute   Date Value Ref Range Status   10/03/2021 0.50 0.0 - 1.7 10*3/uL Final     Eosinophils Absolute   Date Value Ref Range Status   10/03/2021 0.07 0.0 - 0.8 10*3/uL Final     Basophils Absolute   Date Value Ref Range Status   10/03/2021 0.04 0.0 - 0.2 10*3/uL Final     Immature Grans, Absolute   Date Value Ref Range Status   10/03/2021 0.06 0.00 - 0.10 10*3/uL Final     nRBC   Date Value Ref Range Status   10/03/2021 0 0 /100(WBC) Final       No results found for: HGBA1C    Lab Results   Component Value Date    [id number] 563 04/19/2021       TSH   Date Value Ref Range Status   04/19/2021 2.840 0.270 - 4.200 uIU/mL Final       No results found for: CHOL  Lab Results   Component Value Date    TRIG 73 04/19/2021     Lab Results   Component Value Date    HDL 69 (H) 04/19/2021     Lab Results   Component Value Date     (H) 04/19/2021     Lab Results   Component Value Date    VLDL 13 04/19/2021     No results found for: LDLHDL      Procedures    Assessment/Plan   Problems Addressed this Visit     Fibromyalgia - Primary    Chronic bilateral low back pain with right-sided sciatica      Diagnoses       Codes Comments    Fibromyalgia    -  Primary ICD-10-CM: M79.7  ICD-9-CM: 729.1     Chronic bilateral low back pain with right-sided sciatica     ICD-10-CM:  "M54.41, G89.29  ICD-9-CM: 724.2, 724.3, 338.29           No orders of the defined types were placed in this encounter.      Current Outpatient Medications   Medication Sig Dispense Refill   • buPROPion XL (WELLBUTRIN XL) 150 MG 24 hr tablet TAKE 1 TABLET BY MOUTH DAILY 30 tablet 3   • cyanocobalamin 1000 MCG/ML injection Inject 1 mL into the appropriate muscle as directed by prescriber Every 28 (Twenty-Eight) Days. 10 mL 1   • fluticasone (Flonase) 50 MCG/ACT nasal spray 2 sprays into the nostril(s) as directed by provider Daily. 18 mL 5   • hydrOXYzine (ATARAX) 50 MG tablet Take 1 tablet by mouth At Night As Needed (sleep and anxiety). 30 tablet 5   • linaclotide (Linzess) 145 MCG capsule capsule Take 1 capsule by mouth Every Morning Before Breakfast. 30 capsule 5   • metroNIDAZOLE (Flagyl) 500 MG tablet Take 1 tablet by mouth 2 (Two) Times a Day. 14 tablet 0   • ondansetron (ZOFRAN) 4 MG tablet Take 1 tablet by mouth Every 8 (Eight) Hours As Needed for Nausea or Vomiting. 60 tablet 0   • SUMAtriptan (Imitrex) 25 MG tablet Take one tablet at onset of headache. May repeat dose one time in 2 hours if headache not relieved.Max 2 tabs in 24 hrs 9 tablet 3   • Syringe/Needle, Disp, (BD ECLIPSE SYRINGE) 25G X 1\" 3 ML misc USE AS DIRECTED FOR B12 INJECTION 100 each 11   • traMADol (ULTRAM) 50 MG tablet Take 1 tablet by mouth Every 6 (Six) Hours As Needed for Moderate Pain . 120 tablet 1   • Trokendi  MG capsule sustained-release 24 hr TAKE 1 CAPSULE BY MOUTH DAILY 90 capsule 1   • vitamin D (ERGOCALCIFEROL) 1.25 MG (71338 UT) capsule capsule Take 1 capsule by mouth 1 (One) Time Per Week. 5 capsule 5     No current facility-administered medications for this visit.       No follow-ups on file.    There are no Patient Instructions on file for this visit.         Time spent on visit:  25   minutes.  This patient has consented to a telehealth visit via video. The visit was scheduled as a video visit to comply with " patient safety concerns in accordance with CDC recommendations.  All vitals recorded within this visit are reported by the patient.      Will fill out FMLA when I get it.

## 2022-05-05 DIAGNOSIS — K59.09 OTHER CONSTIPATION: ICD-10-CM

## 2022-05-05 DIAGNOSIS — G43.109 MIGRAINE WITH AURA AND WITHOUT STATUS MIGRAINOSUS, NOT INTRACTABLE: ICD-10-CM

## 2022-05-05 RX ORDER — TOPIRAMATE 100 MG/1
1 CAPSULE, EXTENDED RELEASE ORAL DAILY
Qty: 90 CAPSULE | Refills: 1 | Status: SHIPPED | OUTPATIENT
Start: 2022-05-05 | End: 2022-08-16 | Stop reason: SDUPTHER

## 2022-05-05 NOTE — TELEPHONE ENCOUNTER
Rx Refill Note  Requested Prescriptions     Pending Prescriptions Disp Refills   • Trokendi  MG capsule sustained-release 24 hr [Pharmacy Med Name: TROKENDI XR 100MG CAPSULES] 90 capsule 1     Sig: TAKE 1 CAPSULE BY MOUTH DAILY      Last office visit with prescribing clinician: 4/19/2022      Next office visit with prescribing clinician: Visit date not found            Mario Cooper MA  05/05/22, 07:10 EDT

## 2022-06-14 ENCOUNTER — TELEPHONE (OUTPATIENT)
Dept: FAMILY MEDICINE CLINIC | Facility: CLINIC | Age: 42
End: 2022-06-14

## 2022-06-14 NOTE — TELEPHONE ENCOUNTER
"Caller: DEVAUGHN     Relationship: Other    Best call back number: [phone]     What is the best time to reach you: ANY TIME     Who are you requesting to speak with (clinical staff, provider,  specific staff member): DR. PITT/CLINICAL     What was the call regarding: TRACY IS CALLING STATING THAT [facility] HAS RECEIVED THE PATIENT'S FMLA PAPERWORK BACK, BUT THEY NEED TO CLARIFY SOMETHING THAT IS ON THE FORM.   SHE STATES SHE NEEDS CLARIFICATION ON QUESTION 4C  THAT STATES DR. PITT \"ADVISED SEVERAL OTHER TREATMENT DATES\"; SHE NEEDS TO KNOW IF THAT WAS TWICE IN ONE YEAR.  PLEASE CONTACT TRACY TO ADVISE.      Do you require a callback: YES   "

## 2022-06-17 NOTE — TELEPHONE ENCOUNTER
Caller: NAOMI    Relationship to patient: NANCIE    Best call back number: [phone]     CLAIM NUMBER:  [id number]    Patient is needing: NAOMI IS CALLING TO CLARIFY ON THE [facility] PAPERWORK THAT WAS RECEIVED. HE STATES THAT ON QUESTION 4C IT WAS NOTED THAT THE PATIENT RECIEVES SEVERAL OTHER TREATMENTS. HE IS NEEDED CLARIFICATION ON IF IT IS MORE THAN TWO TREATMENTS A YEAR AND ONGOING. PLEASE ADVISE.

## 2022-07-24 DIAGNOSIS — G89.29 OTHER CHRONIC PAIN: ICD-10-CM

## 2022-07-24 DIAGNOSIS — M79.7 FIBROMYALGIA: ICD-10-CM

## 2022-07-25 NOTE — TELEPHONE ENCOUNTER
Rx Refill Note  Requested Prescriptions     Pending Prescriptions Disp Refills   • traMADol (ULTRAM) 50 MG tablet [Pharmacy Med Name: TRAMADOL 50MG TABLETS] 120 tablet      Sig: TAKE 1 TABLET BY MOUTH EVERY 6 HOURS AS NEEDED FOR MODERATE PAIN      Last office visit with prescribing clinician: 4/19/2022      Next office visit with prescribing clinician: Visit date not found            Mario Cooper, EDWIN/LMR  07/25/22, 08:27 EDT

## 2022-07-26 NOTE — TELEPHONE ENCOUNTER
LMTCB    Ok for HUB to read    Let patient know to make an appointment.  Then I can fill the tramadol

## 2022-07-27 RX ORDER — TRAMADOL HYDROCHLORIDE 50 MG/1
TABLET ORAL
Qty: 120 TABLET | OUTPATIENT
Start: 2022-07-27

## 2022-08-04 DIAGNOSIS — G89.29 OTHER CHRONIC PAIN: ICD-10-CM

## 2022-08-04 DIAGNOSIS — M79.7 FIBROMYALGIA: ICD-10-CM

## 2022-08-04 NOTE — TELEPHONE ENCOUNTER
Caller: Serenity Colbert    Relationship: Self    Best call back number: [phone]    Requested Prescriptions:   Requested Prescriptions     Pending Prescriptions Disp Refills   • traMADol (ULTRAM) 50 MG tablet 120 tablet 1     Sig: Take 1 tablet by mouth Every 6 (Six) Hours As Needed for Moderate Pain .        [id number] [place] [address] [address] AT [facility] [address] - [phone] [facility] [phone] FX     Additional details provided by patient: PATIENT IS SCHEDULED FOR NEXT AVAILABLE, WHICH IS 8/16/22 AT 4:30    Does the patient have less than a 3 day supply:  [x] Yes  [] No    Qing Lara Rep   08/04/22 15:38 EDT

## 2022-08-04 NOTE — TELEPHONE ENCOUNTER
Rx Refill Note  Requested Prescriptions     Pending Prescriptions Disp Refills   • traMADol (ULTRAM) 50 MG tablet 120 tablet 1     Sig: Take 1 tablet by mouth Every 6 (Six) Hours As Needed for Moderate Pain .      Last office visit with prescribing clinician: 4/19/2021      Next office visit with prescribing clinician: 8/16/2022

## 2022-08-05 RX ORDER — TRAMADOL HYDROCHLORIDE 50 MG/1
50 TABLET ORAL EVERY 6 HOURS PRN
Qty: 120 TABLET | Refills: 0 | Status: SHIPPED | OUTPATIENT
Start: 2022-08-05 | End: 2022-08-16 | Stop reason: SDUPTHER

## 2022-08-16 ENCOUNTER — TELEMEDICINE (OUTPATIENT)
Dept: FAMILY MEDICINE CLINIC | Facility: CLINIC | Age: 42
End: 2022-08-16

## 2022-08-16 DIAGNOSIS — M79.7 FIBROMYALGIA: ICD-10-CM

## 2022-08-16 DIAGNOSIS — G43.109 MIGRAINE WITH AURA AND WITHOUT STATUS MIGRAINOSUS, NOT INTRACTABLE: ICD-10-CM

## 2022-08-16 DIAGNOSIS — G89.29 OTHER CHRONIC PAIN: ICD-10-CM

## 2022-08-16 DIAGNOSIS — K59.09 OTHER CONSTIPATION: ICD-10-CM

## 2022-08-16 DIAGNOSIS — F41.9 ANXIETY AND DEPRESSION: ICD-10-CM

## 2022-08-16 DIAGNOSIS — F51.01 PRIMARY INSOMNIA: Primary | ICD-10-CM

## 2022-08-16 DIAGNOSIS — R11.0 NAUSEA: ICD-10-CM

## 2022-08-16 DIAGNOSIS — F32.A ANXIETY AND DEPRESSION: ICD-10-CM

## 2022-08-16 PROCEDURE — 99214 OFFICE O/P EST MOD 30 MIN: CPT | Performed by: FAMILY MEDICINE

## 2022-08-16 RX ORDER — TOPIRAMATE 100 MG/1
1 CAPSULE, EXTENDED RELEASE ORAL DAILY
Qty: 90 CAPSULE | Refills: 1 | Status: SHIPPED | OUTPATIENT
Start: 2022-08-16 | End: 2022-11-13

## 2022-08-16 RX ORDER — ONDANSETRON 4 MG/1
4 TABLET, FILM COATED ORAL EVERY 8 HOURS PRN
Qty: 60 TABLET | Refills: 0 | Status: SHIPPED | OUTPATIENT
Start: 2022-08-16

## 2022-08-16 RX ORDER — BUPROPION HYDROCHLORIDE 150 MG/1
150 TABLET ORAL DAILY
Qty: 30 TABLET | Refills: 3 | Status: SHIPPED | OUTPATIENT
Start: 2022-08-16 | End: 2022-12-09

## 2022-08-16 RX ORDER — TRAMADOL HYDROCHLORIDE 50 MG/1
50 TABLET ORAL EVERY 6 HOURS PRN
Qty: 120 TABLET | Refills: 1 | Status: SHIPPED | OUTPATIENT
Start: 2022-08-16 | End: 2022-12-22 | Stop reason: SDUPTHER

## 2022-08-16 NOTE — PROGRESS NOTES
CC:  Fibromyalgia    Subjective   Serenity Colbert is a 42 y.o. female.     History of Present Illness   The patient presents today for follow-up via a video visit due to the COVID-19 pandemic for  Fibromyalgia- stable with med and will need refills.  No HI or SI  Nausea - occasionally and needs refills.  Anxiety- trintellix caused SE and wants to try different med and go back to wellbutrin.            The following portions of the patient's history were reviewed and updated as appropriate: allergies, current medications, past family history, past medical history, past social history, past surgical history and problem list.    Review of Systems    Patient Active Problem List   Diagnosis   • Migraine with aura   • Lyme disease   • Ganglion cyst   • Fibromyalgia   • Atrial septal defect   • Vitamin D deficiency   • Migraine without aura and with status migrainosus, not intractable   • Anxiety   • Primary insomnia   • Chronic bilateral low back pain with right-sided sciatica       Allergies   Allergen Reactions   • Peanut-Containing Drug Products Anaphylaxis   • Heparin Other (See Comments)     BP dropped    • Ibuprofen Other (See Comments)     Stomach bleeding   • Nsaids          Current Outpatient Medications:   •  buPROPion XL (WELLBUTRIN XL) 150 MG 24 hr tablet, Take 1 tablet by mouth Daily., Disp: 30 tablet, Rfl: 3  •  ondansetron (ZOFRAN) 4 MG tablet, Take 1 tablet by mouth Every 8 (Eight) Hours As Needed for Nausea or Vomiting., Disp: 60 tablet, Rfl: 0  •  Topiramate ER (Trokendi XR) 100 MG capsule sustained-release 24 hr, Take 1 capsule by mouth Daily., Disp: 90 capsule, Rfl: 1  •  traMADol (ULTRAM) 50 MG tablet, Take 1 tablet by mouth Every 6 (Six) Hours As Needed for Moderate Pain ., Disp: 120 tablet, Rfl: 1  •  cyanocobalamin 1000 MCG/ML injection, Inject 1 mL into the appropriate muscle as directed by prescriber Every 28 (Twenty-Eight) Days., Disp: 10 mL, Rfl: 1  •  fluticasone (Flonase) 50 MCG/ACT nasal  "spray, 2 sprays into the nostril(s) as directed by provider Daily., Disp: 18 mL, Rfl: 5  •  hydrOXYzine (ATARAX) 50 MG tablet, Take 1 tablet by mouth At Night As Needed (sleep and anxiety)., Disp: 30 tablet, Rfl: 5  •  linaclotide (Linzess) 145 MCG capsule capsule, Take 1 capsule by mouth Every Morning Before Breakfast., Disp: 30 capsule, Rfl: 5  •  metroNIDAZOLE (Flagyl) 500 MG tablet, Take 1 tablet by mouth 2 (Two) Times a Day., Disp: 14 tablet, Rfl: 0  •  SUMAtriptan (Imitrex) 25 MG tablet, Take one tablet at onset of headache. May repeat dose one time in 2 hours if headache not relieved.Max 2 tabs in 24 hrs, Disp: 9 tablet, Rfl: 3  •  Syringe/Needle, Disp, (BD ECLIPSE SYRINGE) 25G X 1\" 3 ML misc, USE AS DIRECTED FOR B12 INJECTION, Disp: 100 each, Rfl: 11  •  vitamin D (ERGOCALCIFEROL) 1.25 MG (60340 UT) capsule capsule, Take 1 capsule by mouth 1 (One) Time Per Week., Disp: 5 capsule, Rfl: 5    Past Medical History:   Diagnosis Date   • GUIDO positive    • Atrial septal defect     as a child. -repared   • Chest pain    • Fibromyalgia    • Ganglion cyst    • Gastric ulcer    • Lyme disease    • Memory deficit    • Migraine with aura    • Osteoarthritis    • Plantar fasciitis    • Therapeutic opioid induced constipation    • Vitamin B12 deficiency    • Vitamin D deficiency        Past Surgical History:   Procedure Laterality Date   • CARDIAC SURGERY      open heart for defect repair of VSD   • GALLBLADDER SURGERY  2007   • GANGLION CYST EXCISION         Family History   Problem Relation Age of Onset   • Breast cancer Mother 49   • Heart attack Father    • Diabetes Father    • Hyperlipidemia Father    • Hypertension Father    • Leukemia Paternal Grandmother    • Colon cancer Maternal Aunt    • Hyperlipidemia Brother    • Diabetes Maternal Grandmother    • Asthma Maternal Grandmother    • COPD Maternal Grandmother    • Stroke Maternal Grandfather    • Diabetes Maternal Grandfather    • Diabetes Paternal Grandfather  "   • Heart disease Other         FH of heart disease in males before age 55       Social History     Tobacco Use   • Smoking status: Never Smoker   • Smokeless tobacco: Never Used   Substance Use Topics   • Alcohol use: Yes     Comment: Rare            Objective     There were no vitals taken for this visit. Video Visit    Physical Exam  NAD  AAOx3  No labored breathing.      Lab Results   Component Value Date    GLUCOSE 82 04/19/2021    BUN 7 04/19/2021    CREATININE 0.65 04/19/2021    EGFRIFNONA 101 04/19/2021    EGFRIFAFRI 122 04/19/2021    BCR 10.8 04/19/2021    K 4.3 04/19/2021    CO2 25.9 04/19/2021    CALCIUM 9.5 04/19/2021    PROTENTOTREF 7.0 04/19/2021    ALBUMIN 4.50 04/19/2021    LABIL2 1.8 04/19/2021    AST 17 04/19/2021    ALT 18 04/19/2021       WBC   Date Value Ref Range Status   10/03/2021 11.20 (H) 4.5 - 11.0 10*3/uL Final     RBC   Date Value Ref Range Status   10/03/2021 5.01 4.0 - 5.2 10*6/uL Final     Hemoglobin   Date Value Ref Range Status   10/03/2021 14.1 12.0 - 16.0 g/dL Final     Hematocrit   Date Value Ref Range Status   10/03/2021 42.7 36.0 - 46.0 % Final     MCV   Date Value Ref Range Status   10/03/2021 85.2 80.0 - 100.0 fL Final     MCH   Date Value Ref Range Status   10/03/2021 28.1 26.0 - 34.0 pg Final     MCHC   Date Value Ref Range Status   10/03/2021 33.0 31.0 - 37.0 g/dL Final     RDW   Date Value Ref Range Status   10/03/2021 12.6 12.0 - 16.8 % Final     MPV   Date Value Ref Range Status   10/03/2021 9.9 8.4 - 12.4 fL Final     Platelets   Date Value Ref Range Status   10/03/2021 268 140 - 440 10*3/uL Final     Neutrophil Rel %   Date Value Ref Range Status   10/03/2021 84.5 (H) 45 - 80 % Final     Lymphocyte Rel %   Date Value Ref Range Status   10/03/2021 9.5 (L) 15 - 50 % Final     Monocyte Rel %   Date Value Ref Range Status   10/03/2021 4.5 0 - 15 % Final     Eosinophil %   Date Value Ref Range Status   10/03/2021 0.6 0 - 7 % Final     Basophil Rel %   Date Value Ref Range  Status   10/03/2021 0.4 0 - 2 % Final     Immature Grans %   Date Value Ref Range Status   10/03/2021 0.5 0.0 - 1.0 % Final     Neutrophils Absolute   Date Value Ref Range Status   10/03/2021 9.47 (H) 2.0 - 8.8 10*3/uL Final     Lymphocytes Absolute   Date Value Ref Range Status   10/03/2021 1.06 0.7 - 5.5 10*3/uL Final     Monocytes Absolute   Date Value Ref Range Status   10/03/2021 0.50 0.0 - 1.7 10*3/uL Final     Eosinophils Absolute   Date Value Ref Range Status   10/03/2021 0.07 0.0 - 0.8 10*3/uL Final     Basophils Absolute   Date Value Ref Range Status   10/03/2021 0.04 0.0 - 0.2 10*3/uL Final     Immature Grans, Absolute   Date Value Ref Range Status   10/03/2021 0.06 0.00 - 0.10 10*3/uL Final     nRBC   Date Value Ref Range Status   10/03/2021 0 0 /100(WBC) Final       No results found for: HGBA1C    Lab Results   Component Value Date    [id number] 563 04/19/2021       TSH   Date Value Ref Range Status   04/19/2021 2.840 0.270 - 4.200 uIU/mL Final       No results found for: CHOL  Lab Results   Component Value Date    TRIG 73 04/19/2021     Lab Results   Component Value Date    HDL 69 (H) 04/19/2021     Lab Results   Component Value Date     (H) 04/19/2021     Lab Results   Component Value Date    VLDL 13 04/19/2021     No results found for: LDLHDL      Procedures    Assessment & Plan   Problems Addressed this Visit     Migraine with aura    Relevant Medications    traMADol (ULTRAM) 50 MG tablet    Topiramate ER (Trokendi XR) 100 MG capsule sustained-release 24 hr    ondansetron (ZOFRAN) 4 MG tablet    buPROPion XL (WELLBUTRIN XL) 150 MG 24 hr tablet    Fibromyalgia    Relevant Medications    traMADol (ULTRAM) 50 MG tablet    Primary insomnia - Primary      Other Visit Diagnoses     Other chronic pain        Relevant Medications    traMADol (ULTRAM) 50 MG tablet    Other constipation        Relevant Medications    Topiramate ER (Trokendi XR) 100 MG capsule sustained-release 24 hr    Nausea         Relevant Medications    ondansetron (ZOFRAN) 4 MG tablet    Anxiety and depression        Relevant Medications    buPROPion XL (WELLBUTRIN XL) 150 MG 24 hr tablet      Diagnoses       Codes Comments    Primary insomnia    -  Primary ICD-10-CM: F51.01  ICD-9-CM: 307.42     Fibromyalgia     ICD-10-CM: M79.7  ICD-9-CM: 729.1     Other chronic pain     ICD-10-CM: G89.29  ICD-9-CM: 338.29     Migraine with aura and without status migrainosus, not intractable     ICD-10-CM: G43.109  ICD-9-CM: 346.00     Other constipation     ICD-10-CM: K59.09  ICD-9-CM: 564.09     Nausea     ICD-10-CM: R11.0  ICD-9-CM: 787.02     Anxiety and depression     ICD-10-CM: F41.9, F32.A  ICD-9-CM: 300.00, 311           No orders of the defined types were placed in this encounter.      Current Outpatient Medications   Medication Sig Dispense Refill   • buPROPion XL (WELLBUTRIN XL) 150 MG 24 hr tablet Take 1 tablet by mouth Daily. 30 tablet 3   • ondansetron (ZOFRAN) 4 MG tablet Take 1 tablet by mouth Every 8 (Eight) Hours As Needed for Nausea or Vomiting. 60 tablet 0   • Topiramate ER (Trokendi XR) 100 MG capsule sustained-release 24 hr Take 1 capsule by mouth Daily. 90 capsule 1   • traMADol (ULTRAM) 50 MG tablet Take 1 tablet by mouth Every 6 (Six) Hours As Needed for Moderate Pain . 120 tablet 1   • cyanocobalamin 1000 MCG/ML injection Inject 1 mL into the appropriate muscle as directed by prescriber Every 28 (Twenty-Eight) Days. 10 mL 1   • fluticasone (Flonase) 50 MCG/ACT nasal spray 2 sprays into the nostril(s) as directed by provider Daily. 18 mL 5   • hydrOXYzine (ATARAX) 50 MG tablet Take 1 tablet by mouth At Night As Needed (sleep and anxiety). 30 tablet 5   • linaclotide (Linzess) 145 MCG capsule capsule Take 1 capsule by mouth Every Morning Before Breakfast. 30 capsule 5   • metroNIDAZOLE (Flagyl) 500 MG tablet Take 1 tablet by mouth 2 (Two) Times a Day. 14 tablet 0   • SUMAtriptan (Imitrex) 25 MG tablet Take one tablet at onset of  "headache. May repeat dose one time in 2 hours if headache not relieved.Max 2 tabs in 24 hrs 9 tablet 3   • Syringe/Needle, Disp, (BD ECLIPSE SYRINGE) 25G X 1\" 3 ML misc USE AS DIRECTED FOR B12 INJECTION 100 each 11   • vitamin D (ERGOCALCIFEROL) 1.25 MG (36853 UT) capsule capsule Take 1 capsule by mouth 1 (One) Time Per Week. 5 capsule 5     No current facility-administered medications for this visit.       Return in about 3 months (around 11/16/2022).    There are no Patient Instructions on file for this visit.         Time spent on visit:  20   minutes.  This patient has consented to a telehealth visit via video. The visit was scheduled as a video visit to comply with patient safety concerns in accordance with CDC recommendations.  All vitals recorded within this visit are reported by the patient.      RefANDRES malik RUN  and reviewed on Epic.  Risks of the medication include but are not limited to fatigue, somnolence, increased risk of falls, constipation, allergic reaction, dependence, and addiction.  Also, emphasized not taking any illicit substances.  Patient is aware and acknowledges information given.  Patient is functioning well with the medication.  Patient denies somnolence or any other side effects with the medication.   Medication refilled.          "

## 2022-08-24 DIAGNOSIS — B96.89 BV (BACTERIAL VAGINOSIS): Primary | ICD-10-CM

## 2022-08-24 DIAGNOSIS — N76.0 BV (BACTERIAL VAGINOSIS): Primary | ICD-10-CM

## 2022-08-24 RX ORDER — METRONIDAZOLE 500 MG/1
500 TABLET ORAL 2 TIMES DAILY
Qty: 14 TABLET | Refills: 0 | Status: SHIPPED | OUTPATIENT
Start: 2022-08-24

## 2022-11-13 DIAGNOSIS — G43.109 MIGRAINE WITH AURA AND WITHOUT STATUS MIGRAINOSUS, NOT INTRACTABLE: ICD-10-CM

## 2022-11-13 DIAGNOSIS — K59.09 OTHER CONSTIPATION: ICD-10-CM

## 2022-11-13 RX ORDER — TOPIRAMATE 100 MG/1
1 CAPSULE, EXTENDED RELEASE ORAL DAILY
Qty: 90 CAPSULE | Refills: 1 | Status: SHIPPED | OUTPATIENT
Start: 2022-11-13

## 2022-12-09 DIAGNOSIS — F32.A ANXIETY AND DEPRESSION: ICD-10-CM

## 2022-12-09 DIAGNOSIS — F41.9 ANXIETY AND DEPRESSION: ICD-10-CM

## 2022-12-09 RX ORDER — BUPROPION HYDROCHLORIDE 150 MG/1
150 TABLET ORAL DAILY
Qty: 30 TABLET | Refills: 3 | Status: SHIPPED | OUTPATIENT
Start: 2022-12-09

## 2022-12-09 NOTE — TELEPHONE ENCOUNTER
Rx Refill Note  Requested Prescriptions     Pending Prescriptions Disp Refills   • buPROPion XL (WELLBUTRIN XL) 150 MG 24 hr tablet [Pharmacy Med Name: BUPROPION XL 150MG TABLETS (24 H)] 30 tablet 3     Sig: TAKE 1 TABLET BY MOUTH DAILY      Last office visit with prescribing clinician: 8/16/2022   Last telemedicine visit with prescribing clinician: 8/16/2022  Next office visit with prescribing clinician: Visit date not found                         Would you like a call back once the refill request has been completed: [] Yes [] No    If the office needs to give you a call back, can they leave a voicemail: [] Yes [] No    Mario Cooper, EDWIN/LMR  12/09/22, 07:48 EST

## 2022-12-22 ENCOUNTER — TELEPHONE (OUTPATIENT)
Dept: FAMILY MEDICINE CLINIC | Facility: CLINIC | Age: 42
End: 2022-12-22

## 2022-12-22 DIAGNOSIS — M79.7 FIBROMYALGIA: ICD-10-CM

## 2022-12-22 DIAGNOSIS — G89.29 OTHER CHRONIC PAIN: ICD-10-CM

## 2022-12-22 RX ORDER — TRAMADOL HYDROCHLORIDE 50 MG/1
TABLET ORAL
Qty: 120 TABLET | OUTPATIENT
Start: 2022-12-22

## 2022-12-22 RX ORDER — TRAMADOL HYDROCHLORIDE 50 MG/1
50 TABLET ORAL EVERY 6 HOURS PRN
Qty: 120 TABLET | Refills: 1 | Status: SHIPPED | OUTPATIENT
Start: 2022-12-22 | End: 2022-12-23 | Stop reason: SDUPTHER

## 2022-12-22 NOTE — TELEPHONE ENCOUNTER
Rx Refill Note  Requested Prescriptions     Pending Prescriptions Disp Refills   • traMADol (ULTRAM) 50 MG tablet [Pharmacy Med Name: TRAMADOL 50MG TABLETS] 120 tablet      Sig: TAKE 1 TABLET BY MOUTH EVERY 6 HOURS AS NEEDED FOR MODERATE PAIN      Last office visit with prescribing clinician: 08/16/2022   Last telemedicine visit with prescribing clinician: Visit date not found   Next office visit with prescribing clinician: Visit date not found  LAST REFILL 08/16/2022   PATIENT WAS TO RETURN IN 11/2022

## 2022-12-23 ENCOUNTER — TELEMEDICINE (OUTPATIENT)
Dept: FAMILY MEDICINE CLINIC | Facility: CLINIC | Age: 42
End: 2022-12-23

## 2022-12-23 DIAGNOSIS — G43.001 MIGRAINE WITHOUT AURA AND WITH STATUS MIGRAINOSUS, NOT INTRACTABLE: Primary | ICD-10-CM

## 2022-12-23 DIAGNOSIS — G89.29 OTHER CHRONIC PAIN: ICD-10-CM

## 2022-12-23 DIAGNOSIS — M79.7 FIBROMYALGIA: ICD-10-CM

## 2022-12-23 PROCEDURE — 99213 OFFICE O/P EST LOW 20 MIN: CPT | Performed by: FAMILY MEDICINE

## 2022-12-23 RX ORDER — TRAMADOL HYDROCHLORIDE 50 MG/1
50 TABLET ORAL EVERY 6 HOURS PRN
Qty: 120 TABLET | Refills: 2 | Status: SHIPPED | OUTPATIENT
Start: 2022-12-23

## 2022-12-23 NOTE — PROGRESS NOTES
CHIEF COMPLAINT:fibromyalgia    Subjective   Serenity Colbert is a 42 y.o. female.     History of Present Illness   Patient presents during the Covid-19 pandemic/federally declared [facility] of public health emergency.  This service was conducted via video visit  PT is at home and I am at my desk at my office.    Pt presents for fibromyalgia and needs refills.  She is doing well with med and no SE.    Migraines- managed with Trokandi.      The following portions of the patient's history were reviewed and updated as appropriate: allergies, current medications, past family history, past medical history, past social history, past surgical history and problem list.    Review of Systems    Patient Active Problem List   Diagnosis   • Migraine with aura   • Lyme disease   • Ganglion cyst   • Fibromyalgia   • Atrial septal defect   • Vitamin D deficiency   • Migraine without aura and with status migrainosus, not intractable   • Anxiety   • Primary insomnia   • Chronic bilateral low back pain with right-sided sciatica       Allergies   Allergen Reactions   • Peanut-Containing Drug Products Anaphylaxis   • Heparin Other (See Comments)     BP dropped    • Ibuprofen Other (See Comments)     Stomach bleeding   • Nsaids          Current Outpatient Medications:   •  traMADol (ULTRAM) 50 MG tablet, Take 1 tablet by mouth Every 6 (Six) Hours As Needed for Moderate Pain., Disp: 120 tablet, Rfl: 2  •  buPROPion XL (WELLBUTRIN XL) 150 MG 24 hr tablet, TAKE 1 TABLET BY MOUTH DAILY, Disp: 30 tablet, Rfl: 3  •  cyanocobalamin 1000 MCG/ML injection, Inject 1 mL into the appropriate muscle as directed by prescriber Every 28 (Twenty-Eight) Days., Disp: 10 mL, Rfl: 1  •  fluticasone (Flonase) 50 MCG/ACT nasal spray, 2 sprays into the nostril(s) as directed by provider Daily., Disp: 18 mL, Rfl: 5  •  hydrOXYzine (ATARAX) 50 MG tablet, Take 1 tablet by mouth At Night As Needed (sleep and anxiety)., Disp: 30 tablet, Rfl: 5  •  linaclotide  "(Linzess) 145 MCG capsule capsule, Take 1 capsule by mouth Every Morning Before Breakfast., Disp: 30 capsule, Rfl: 5  •  metroNIDAZOLE (Flagyl) 500 MG tablet, Take 1 tablet by mouth 2 (Two) Times a Day., Disp: 14 tablet, Rfl: 0  •  ondansetron (ZOFRAN) 4 MG tablet, Take 1 tablet by mouth Every 8 (Eight) Hours As Needed for Nausea or Vomiting., Disp: 60 tablet, Rfl: 0  •  SUMAtriptan (Imitrex) 25 MG tablet, Take one tablet at onset of headache. May repeat dose one time in 2 hours if headache not relieved.Max 2 tabs in 24 hrs, Disp: 9 tablet, Rfl: 3  •  Syringe/Needle, Disp, (BD ECLIPSE SYRINGE) 25G X 1\" 3 ML misc, USE AS DIRECTED FOR B12 INJECTION, Disp: 100 each, Rfl: 11  •  Trokendi  MG capsule sustained-release 24 hr, TAKE 1 CAPSULE BY MOUTH DAILY, Disp: 90 capsule, Rfl: 1  •  vitamin D (ERGOCALCIFEROL) 1.25 MG (50432 UT) capsule capsule, Take 1 capsule by mouth 1 (One) Time Per Week., Disp: 5 capsule, Rfl: 5    Past Medical History:   Diagnosis Date   • GUIDO positive    • Atrial septal defect     as a child. -repared   • Chest pain    • Fibromyalgia    • Ganglion cyst    • Gastric ulcer    • Lyme disease    • Memory deficit    • Migraine with aura    • Osteoarthritis    • Plantar fasciitis    • Therapeutic opioid induced constipation    • Vitamin B12 deficiency    • Vitamin D deficiency        Past Surgical History:   Procedure Laterality Date   • CARDIAC SURGERY      open heart for defect repair of VSD   • GALLBLADDER SURGERY  2007   • GANGLION CYST EXCISION         Family History   Problem Relation Age of Onset   • Breast cancer Mother 49   • Heart attack Father    • Diabetes Father    • Hyperlipidemia Father    • Hypertension Father    • Leukemia Paternal Grandmother    • Colon cancer Maternal Aunt    • Hyperlipidemia Brother    • Diabetes Maternal Grandmother    • Asthma Maternal Grandmother    • COPD Maternal Grandmother    • Stroke Maternal Grandfather    • Diabetes Maternal Grandfather    • Diabetes " Paternal Grandfather    • Heart disease Other         FH of heart disease in males before age 55       Social History     Tobacco Use   • Smoking status: Never   • Smokeless tobacco: Never   Substance Use Topics   • Alcohol use: Yes     Comment: Rare            Objective     There were no vitals taken for this visit. Video Visit    Physical Exam  NAD  AAOx3  No labored breathing.      Lab Results   Component Value Date    GLUCOSE 82 04/19/2021    BUN 7 04/19/2021    CREATININE 0.65 04/19/2021    EGFRIFNONA 101 04/19/2021    EGFRIFAFRI 122 04/19/2021    BCR 10.8 04/19/2021    K 4.3 04/19/2021    CO2 25.9 04/19/2021    CALCIUM 9.5 04/19/2021    PROTENTOTREF 7.0 04/19/2021    ALBUMIN 4.50 04/19/2021    LABIL2 1.8 04/19/2021    AST 17 04/19/2021    ALT 18 04/19/2021       WBC   Date Value Ref Range Status   10/03/2021 11.20 (H) 4.5 - 11.0 10*3/uL Final     RBC   Date Value Ref Range Status   10/03/2021 5.01 4.0 - 5.2 10*6/uL Final     Hemoglobin   Date Value Ref Range Status   10/03/2021 14.1 12.0 - 16.0 g/dL Final     Hematocrit   Date Value Ref Range Status   10/03/2021 42.7 36.0 - 46.0 % Final     MCV   Date Value Ref Range Status   10/03/2021 85.2 80.0 - 100.0 fL Final     MCH   Date Value Ref Range Status   10/03/2021 28.1 26.0 - 34.0 pg Final     MCHC   Date Value Ref Range Status   10/03/2021 33.0 31.0 - 37.0 g/dL Final     RDW   Date Value Ref Range Status   10/03/2021 12.6 12.0 - 16.8 % Final     MPV   Date Value Ref Range Status   10/03/2021 9.9 8.4 - 12.4 fL Final     Platelets   Date Value Ref Range Status   10/03/2021 268 140 - 440 10*3/uL Final     Neutrophil Rel %   Date Value Ref Range Status   10/03/2021 84.5 (H) 45 - 80 % Final     Lymphocyte Rel %   Date Value Ref Range Status   10/03/2021 9.5 (L) 15 - 50 % Final     Monocyte Rel %   Date Value Ref Range Status   10/03/2021 4.5 0 - 15 % Final     Eosinophil %   Date Value Ref Range Status   10/03/2021 0.6 0 - 7 % Final     Basophil Rel %   Date Value  Ref Range Status   10/03/2021 0.4 0 - 2 % Final     Immature Grans %   Date Value Ref Range Status   10/03/2021 0.5 0.0 - 1.0 % Final     Neutrophils Absolute   Date Value Ref Range Status   10/03/2021 9.47 (H) 2.0 - 8.8 10*3/uL Final     Lymphocytes Absolute   Date Value Ref Range Status   10/03/2021 1.06 0.7 - 5.5 10*3/uL Final     Monocytes Absolute   Date Value Ref Range Status   10/03/2021 0.50 0.0 - 1.7 10*3/uL Final     Eosinophils Absolute   Date Value Ref Range Status   10/03/2021 0.07 0.0 - 0.8 10*3/uL Final     Basophils Absolute   Date Value Ref Range Status   10/03/2021 0.04 0.0 - 0.2 10*3/uL Final     Immature Grans, Absolute   Date Value Ref Range Status   10/03/2021 0.06 0.00 - 0.10 10*3/uL Final     nRBC   Date Value Ref Range Status   10/03/2021 0 0 /100(WBC) Final       No results found for: HGBA1C    Lab Results   Component Value Date    [id number] 563 04/19/2021       TSH   Date Value Ref Range Status   04/19/2021 2.840 0.270 - 4.200 uIU/mL Final       No results found for: CHOL  Lab Results   Component Value Date    TRIG 73 04/19/2021     Lab Results   Component Value Date    HDL 69 (H) 04/19/2021     Lab Results   Component Value Date     (H) 04/19/2021     Lab Results   Component Value Date    VLDL 13 04/19/2021     No results found for: LDLHDL      Procedures    Assessment & Plan   Problems Addressed this Visit     Fibromyalgia    Relevant Medications    traMADol (ULTRAM) 50 MG tablet    Migraine without aura and with status migrainosus, not intractable - Primary    Relevant Medications    traMADol (ULTRAM) 50 MG tablet   Other Visit Diagnoses     Other chronic pain        Relevant Medications    traMADol (ULTRAM) 50 MG tablet      Diagnoses       Codes Comments    Migraine without aura and with status migrainosus, not intractable    -  Primary ICD-10-CM: G43.001  ICD-9-CM: 346.12     Fibromyalgia     ICD-10-CM: M79.7  ICD-9-CM: 729.1     Other chronic pain     ICD-10-CM:  "G89.29  ICD-9-CM: 338.29           No orders of the defined types were placed in this encounter.      Current Outpatient Medications   Medication Sig Dispense Refill   • traMADol (ULTRAM) 50 MG tablet Take 1 tablet by mouth Every 6 (Six) Hours As Needed for Moderate Pain. 120 tablet 2   • buPROPion XL (WELLBUTRIN XL) 150 MG 24 hr tablet TAKE 1 TABLET BY MOUTH DAILY 30 tablet 3   • cyanocobalamin 1000 MCG/ML injection Inject 1 mL into the appropriate muscle as directed by prescriber Every 28 (Twenty-Eight) Days. 10 mL 1   • fluticasone (Flonase) 50 MCG/ACT nasal spray 2 sprays into the nostril(s) as directed by provider Daily. 18 mL 5   • hydrOXYzine (ATARAX) 50 MG tablet Take 1 tablet by mouth At Night As Needed (sleep and anxiety). 30 tablet 5   • linaclotide (Linzess) 145 MCG capsule capsule Take 1 capsule by mouth Every Morning Before Breakfast. 30 capsule 5   • metroNIDAZOLE (Flagyl) 500 MG tablet Take 1 tablet by mouth 2 (Two) Times a Day. 14 tablet 0   • ondansetron (ZOFRAN) 4 MG tablet Take 1 tablet by mouth Every 8 (Eight) Hours As Needed for Nausea or Vomiting. 60 tablet 0   • SUMAtriptan (Imitrex) 25 MG tablet Take one tablet at onset of headache. May repeat dose one time in 2 hours if headache not relieved.Max 2 tabs in 24 hrs 9 tablet 3   • Syringe/Needle, Disp, (BD ECLIPSE SYRINGE) 25G X 1\" 3 ML misc USE AS DIRECTED FOR B12 INJECTION 100 each 11   • Trokendi  MG capsule sustained-release 24 hr TAKE 1 CAPSULE BY MOUTH DAILY 90 capsule 1   • vitamin D (ERGOCALCIFEROL) 1.25 MG (90390 UT) capsule capsule Take 1 capsule by mouth 1 (One) Time Per Week. 5 capsule 5     No current facility-administered medications for this visit.       Return in about 3 months (around 3/23/2023).    There are no Patient Instructions on file for this visit.         Time spent on visit:  25   minutes.  This patient has consented to a telehealth visit via video. The visit was scheduled as a video visit to comply with patient " safety concerns in accordance with CDC recommendations.  All vitals recorded within this visit are reported by the patient.        ANDRES RUN  and reviewed on Epic.  Risks of the medication include but are not limited to fatigue, somnolence, increased risk of falls, constipation, allergic reaction, dependence, and addiction.  Also, emphasized not taking any illicit substances.  Patient is aware and acknowledges information given.  Patient is functioning well with the medication.  Patient denies somnolence or any other side effects with the medication.   Medication refilled.

## 2023-02-07 DIAGNOSIS — L30.8 OTHER ECZEMA: Primary | ICD-10-CM

## 2023-02-07 RX ORDER — TRIAMCINOLONE ACETONIDE 1 MG/G
1 CREAM TOPICAL 2 TIMES DAILY
Qty: 45 G | Refills: 1 | Status: SHIPPED | OUTPATIENT
Start: 2023-02-07

## 2023-02-07 RX ORDER — PIMECROLIMUS 10 MG/G
1 CREAM TOPICAL 2 TIMES DAILY
Qty: 30 G | Refills: 3 | Status: SHIPPED | OUTPATIENT
Start: 2023-02-07

## 2023-04-26 DIAGNOSIS — F51.01 PRIMARY INSOMNIA: ICD-10-CM

## 2023-04-26 DIAGNOSIS — F41.9 ANXIETY: ICD-10-CM

## 2023-04-26 RX ORDER — DIAZEPAM 5 MG/1
TABLET ORAL
COMMUNITY
Start: 2023-04-06

## 2023-04-26 RX ORDER — PRAZOSIN HYDROCHLORIDE 1 MG/1
1 CAPSULE ORAL
COMMUNITY
Start: 2023-04-04

## 2023-04-26 RX ORDER — PAROXETINE HYDROCHLORIDE 20 MG/1
20 TABLET, FILM COATED ORAL EVERY MORNING
COMMUNITY
Start: 2023-04-04

## 2023-04-26 RX ORDER — HYDROXYZINE 50 MG/1
TABLET, FILM COATED ORAL
Qty: 30 TABLET | Refills: 5 | OUTPATIENT
Start: 2023-04-26

## 2023-04-26 NOTE — TELEPHONE ENCOUNTER
PATIENT STATES SHE IS RETURNING A MISSED CALL FROM [facility].     HUB READ THE PATIENT THE HUB TO READ MESSAGE.     PATIENT STATES SHE DOES NOT WANT TO SCHEDULE AN APPOINTMENT RIGHT NOW.

## 2023-04-26 NOTE — TELEPHONE ENCOUNTER
LOV            12/23/22  NOV             around 3/23/2023  Last RF       4/8/22    Rob called pt and LM to call and make an appt.    Malissa Salas, DOLORES/JOER

## 2023-06-19 ENCOUNTER — PRIOR AUTHORIZATION (OUTPATIENT)
Dept: FAMILY MEDICINE CLINIC | Facility: CLINIC | Age: 43
End: 2023-06-19
Payer: COMMERCIAL

## 2023-06-26 PROBLEM — E53.8 B12 DEFICIENCY: Status: ACTIVE | Noted: 2023-06-26

## 2023-09-21 DIAGNOSIS — G43.109 MIGRAINE WITH AURA AND WITHOUT STATUS MIGRAINOSUS, NOT INTRACTABLE: ICD-10-CM

## 2023-09-21 DIAGNOSIS — K59.09 OTHER CONSTIPATION: ICD-10-CM

## 2023-09-21 RX ORDER — TOPIRAMATE 100 MG/1
1 CAPSULE, EXTENDED RELEASE ORAL DAILY
Qty: 90 CAPSULE | Refills: 1 | Status: SHIPPED | OUTPATIENT
Start: 2023-09-21

## 2024-03-07 ENCOUNTER — HOSPITAL ENCOUNTER (EMERGENCY)
Facility: HOSPITAL | Age: 44
Discharge: HOME | End: 2024-03-08
Attending: EMERGENCY MEDICINE
Payer: MEDICAID

## 2024-03-07 VITALS
WEIGHT: 240 LBS | RESPIRATION RATE: 18 BRPM | SYSTOLIC BLOOD PRESSURE: 162 MMHG | TEMPERATURE: 98.2 F | BODY MASS INDEX: 38.57 KG/M2 | HEIGHT: 66 IN | DIASTOLIC BLOOD PRESSURE: 101 MMHG | HEART RATE: 93 BPM | OXYGEN SATURATION: 98 %

## 2024-03-07 DIAGNOSIS — R07.89 ATYPICAL CHEST PAIN: ICD-10-CM

## 2024-03-07 DIAGNOSIS — R09.1 PLEURISY: Primary | ICD-10-CM

## 2024-03-07 PROCEDURE — 99283 EMERGENCY DEPT VISIT LOW MDM: CPT

## 2024-03-07 RX ORDER — PREDNISONE 20 MG/1
60 TABLET ORAL ONCE
Status: COMPLETED | OUTPATIENT
Start: 2024-03-08 | End: 2024-03-08

## 2024-03-07 RX ORDER — HYDROCODONE BITARTRATE AND ACETAMINOPHEN 5; 325 MG/1; MG/1
1 TABLET ORAL ONCE
Status: COMPLETED | OUTPATIENT
Start: 2024-03-08 | End: 2024-03-08

## 2024-03-07 NOTE — Clinical Note
[place]  [facility] EMERGENCY DEPARTMENT  [address]   [place] [address]  Phone: [phone]    Serenity Colbert was seen and treated in our emergency department on 3/7/2024.  She may return to work on 03/11/2024.         Thank you for choosing [facility].    Berhane Hyde MD

## 2024-03-07 NOTE — Clinical Note
[place]  [facility] EMERGENCY DEPARTMENT  [address]   [place] [address]  Phone: [phone]    Serenity Colbert was seen and treated in our emergency department on 3/7/2024.  She may return to work on 03/11/2024.         Thank you for choosing [facility].    Berhane Hyde MD

## 2024-03-08 ENCOUNTER — APPOINTMENT (OUTPATIENT)
Dept: GENERAL RADIOLOGY | Facility: HOSPITAL | Age: 44
End: 2024-03-08
Payer: MEDICAID

## 2024-03-08 PROCEDURE — 71101 X-RAY EXAM UNILAT RIBS/CHEST: CPT

## 2024-03-08 PROCEDURE — 63710000001 PREDNISONE PER 1 MG: Performed by: EMERGENCY MEDICINE

## 2024-03-08 RX ORDER — PREDNISONE 20 MG/1
TABLET ORAL
Qty: 9 TABLET | Refills: 0 | Status: SHIPPED | OUTPATIENT
Start: 2024-03-08

## 2024-03-08 RX ADMIN — HYDROCODONE BITARTRATE AND ACETAMINOPHEN 1 TABLET: 5; 325 TABLET ORAL at 00:09

## 2024-03-08 RX ADMIN — PREDNISONE 60 MG: 20 TABLET ORAL at 00:09

## 2024-03-08 NOTE — ED PROVIDER NOTES
Subjective   History of Present Illness  Patient presents complaining of left anterior lateral chest pain about mid rib cage that started today around 1:30 PM.  Patient reports she has been coughing a lot and was diagnosed with bronchitis yesterday and put on Augmentin and given Tessalon Perles.  Since then she continues to cough.  Patient says before she was put on medicine yesterday she had been on Mucinex for about 4 5 days and was producing some sputum but it was never dark-colored or bloody.  Since then she has had no sputum production.  Patient denies any recent travel, sick contacts, bad food exposure, or trauma.  No history of DVT or PE in the past.  Patient denies any recent swelling of her legs, feet, hands.  Patient says the pain is not constant but if she coughs or breathes deeply gets much worse.  Patient denies any radiation to the right.  Patient says she does feel that wrapping around underneath her left breast and going into her left shoulder blade.  Patient denies any external bruising or rash.  Patient is able to get into a position of comfort and if she does not breathe to deep it does make it seem better.  Patient denies any recent changes in her ability to do her typical activities of daily living and no history of exertional chest pain in the past.  Patient denies any fever, syncope, shortness of breath and no recent therapy prior to arrival.  Patient has been trying some ibuprofen but gets minimal relief.      Review of Systems   All other systems reviewed and are negative.      Past Medical History:   Diagnosis Date    GUIDO positive     Atrial septal defect     as a child. -repared    Chest pain     Fibromyalgia     Ganglion cyst     Gastric ulcer     Lyme disease     Memory deficit     Migraine with aura     Osteoarthritis     Plantar fasciitis     Therapeutic opioid induced constipation     Vitamin B12 deficiency     Vitamin D deficiency        Allergies   Allergen Reactions     Peanut-Containing Drug Products Anaphylaxis    Heparin Other (See Comments)     BP dropped     Ibuprofen Other (See Comments)     Stomach bleeding    Nsaids        Past Surgical History:   Procedure Laterality Date    CARDIAC SURGERY      open heart for defect repair of VSD    GALLBLADDER SURGERY  2007    GANGLION CYST EXCISION         Family History   Problem Relation Age of Onset    Breast cancer Mother 49    Heart attack Father     Diabetes Father     Hyperlipidemia Father     Hypertension Father     Leukemia Paternal Grandmother     Colon cancer Maternal Aunt     Hyperlipidemia Brother     Diabetes Maternal Grandmother     Asthma Maternal Grandmother     COPD Maternal Grandmother     Stroke Maternal Grandfather     Diabetes Maternal Grandfather     Diabetes Paternal Grandfather     Heart disease Other         FH of heart disease in males before age 55       Social History     Socioeconomic History    Marital status:    Tobacco Use    Smoking status: Never    Smokeless tobacco: Never   Vaping Use    Vaping status: Never Used   Substance and Sexual Activity    Alcohol use: Yes     Comment: Rare    Drug use: No    Sexual activity: Defer     Comment: vasectomy           Objective   Physical Exam  Vitals and nursing note reviewed.   Constitutional:       Comments: Patient sitting in bed comfortably, talkative, friendly.  No signs of distress.  Cooperative with exam.   HENT:      Head: Normocephalic.      Right Ear: External ear normal.      Left Ear: External ear normal.      Nose: Nose normal.      Mouth/Throat:      Mouth: Mucous membranes are moist.      Pharynx: Oropharynx is clear.   Eyes:      Conjunctiva/sclera: Conjunctivae normal.   Cardiovascular:      Rate and Rhythm: Normal rate and regular rhythm.      Pulses:           Radial pulses are 2+ on the right side and 2+ on the left side.        Dorsalis pedis pulses are 2+ on the right side and 2+ on the left side.        Posterior tibial pulses are  2+ on the right side and 2+ on the left side.      Heart sounds: Normal heart sounds, S1 normal and S2 normal. No murmur heard.  Pulmonary:      Effort: Pulmonary effort is normal.      Breath sounds: Normal breath sounds.   Chest:          Comments: Tenderness to palpation over the circled area.  No clicking or crepitus felt.  Abdominal:      General: There is no distension.      Palpations: Abdomen is soft.      Tenderness: There is no abdominal tenderness.   Musculoskeletal:      Cervical back: Neck supple.      Right lower leg: No edema.      Left lower leg: No edema.   Lymphadenopathy:      Cervical: No cervical adenopathy.   Skin:     General: Skin is warm and dry.      Capillary Refill: Capillary refill takes 2 to 3 seconds.   Neurological:      Mental Status: She is alert and oriented to person, place, and time.   Psychiatric:         Mood and Affect: Mood normal.         Behavior: Behavior normal.         Procedures           ED Course                                             Medical Decision Making  Ddx bronchitis, pneumonia, pleurisy, costochondritis, pneumothorax, PE    PERC Rule for Pulmonary Embolism - MDCalc  Calculated on Mar 07 2024 11:49 PM  0 criteria -> No need for further workup, as <2% chance of PE. If no criteria are positive and clinician's pre-test probability is <15%, PERC Rule criteria are satisfied.    XR Ribs Left With PA Chest    Result Date: 3/8/2024  Impression: 1.No displaced left rib fracture. 2.No acute cardiopulmonary abnormality. Electronically Signed: Tahir Bryant MD  3/8/2024 12:36 AM EST  Workstation ID: [id number]    0043 Pt seen again prior to d/c.  Imaging reviewed and are unremarkable.  Vitals stable and pt. in NAD. Non-toxic. Comfortable. Ambulating without difficulty.  Tolerating po.  Relaxed breathing.  All questions personally answered at the bedside and all d/c instructions personally reviewed with pt.  Discussed the importance of close outpt. f/u and pt.  understands this and agrees to do so.  Pt agrees to return to ED immediately for any new, persistent, or worsening symptoms.  Patient spouse was present for the entire evaluation and discharge instructions.    EMR Dragon/Transcription disclaimer:  Much of this encounter note is an electronic transcription/translation of spoken language to printed text, aka voice recognition.  The electronic translation of spoken language may permit erroneous or at times nonsensical words or phrases to be inadvertently transcribed; although I have reviewed the note for such errors, some may still exist so please interpret based on surrounding text content.      Amount and/or Complexity of Data Reviewed  Radiology: ordered.    Risk  Prescription drug management.        Final diagnoses:   Pleurisy   Atypical chest pain       ED Disposition  ED Disposition       ED Disposition   Discharge    Condition   Stable    Comment   --               Cris Gonzales MD  [address] [address] [address] [phone]    In 3 days           Medication List        New Prescriptions      predniSONE 20 MG tablet  Commonly known as: DELTASONE  Take 3 tabs p.o. daily on day 1, then 2 tabs p.o. on days 2-3, then 1 tab p.o. on days 4-5.  You had your first dose in the ED so you would not need to start this prescription until 03/08/2024.               Where to Get Your Medications        These medications were sent to [id number] - [place] [id number] [facility] [address] - [phone]  - [phone]   [address] [address], [place] [address]      Phone: [phone]   predniSONE 20 MG tablet            Berhane Hyde MD  03/08/24 [id number]

## 2024-03-21 ENCOUNTER — TELEMEDICINE (OUTPATIENT)
Dept: FAMILY MEDICINE CLINIC | Facility: CLINIC | Age: 44
End: 2024-03-21
Payer: MEDICAID

## 2024-03-21 DIAGNOSIS — J40 BRONCHITIS: Primary | ICD-10-CM

## 2024-03-21 DIAGNOSIS — R06.2 WHEEZING: ICD-10-CM

## 2024-03-21 DIAGNOSIS — J45.41 MODERATE PERSISTENT EXTRINSIC ASTHMA WITH ACUTE EXACERBATION: ICD-10-CM

## 2024-03-21 DIAGNOSIS — R05.3 CHRONIC COUGH: ICD-10-CM

## 2024-03-21 PROCEDURE — 1159F MED LIST DOCD IN RCRD: CPT | Performed by: FAMILY MEDICINE

## 2024-03-21 PROCEDURE — 1160F RVW MEDS BY RX/DR IN RCRD: CPT | Performed by: FAMILY MEDICINE

## 2024-03-21 PROCEDURE — 99213 OFFICE O/P EST LOW 20 MIN: CPT | Performed by: FAMILY MEDICINE

## 2024-03-21 RX ORDER — BENZONATATE 200 MG/1
200 CAPSULE ORAL 3 TIMES DAILY PRN
Qty: 30 CAPSULE | Refills: 0 | Status: SHIPPED | OUTPATIENT
Start: 2024-03-21

## 2024-03-21 RX ORDER — FLUTICASONE PROPIONATE AND SALMETEROL 250; 50 UG/1; UG/1
1 POWDER RESPIRATORY (INHALATION)
Qty: 60 EACH | Refills: 3 | Status: SHIPPED | OUTPATIENT
Start: 2024-03-21

## 2024-03-21 RX ORDER — MONTELUKAST SODIUM 10 MG/1
10 TABLET ORAL NIGHTLY
Qty: 30 TABLET | Refills: 3 | Status: SHIPPED | OUTPATIENT
Start: 2024-03-21

## 2024-03-21 NOTE — PROGRESS NOTES
CHIEF COMPLAINT: Bronchitis and cough    Subjective   Serenity Colbert is a 43 y.o. female.     History of Present Illness   Patient presents during the Covid-19 pandemic/federally declared [facility] of public health emergency.  This service was conducted via video visit  PT is at home and I am at my desk at my office.    Patient presents today with a month-long history of cough, congestion, bronchitis and URI.  She initially went to the urgent care center and was given Ventolin and Augmentin and Tessalon Perles.  The next day she went to the ER with left-sided chest pleurisy.  She was given steroids at that point.  She continued having symptoms and is not feeling better.  She did have COVID January 29, 2024.  She does have a lot of wheezing.  No shortness of breath.  She is wanting something for the cough and a maintenance inhaler.  She has also been taking over-the-counter cold meds.  She had a normal chest x-ray on 3/6/2024.  She had a normal rib x-ray on 3/8/2024.    The following portions of the patient's history were reviewed and updated as appropriate: allergies, current medications, past family history, past medical history, past social history, past surgical history and problem list.    Review of Systems    Patient Active Problem List   Diagnosis    Migraine with aura    Lyme disease    Ganglion cyst    Fibromyalgia    Atrial septal defect    Vitamin D deficiency    Migraine without aura and with status migrainosus, not intractable    Anxiety    Primary insomnia    Chronic bilateral low back pain with right-sided sciatica    B12 deficiency       Allergies   Allergen Reactions    Peanut-Containing Drug Products Anaphylaxis    Heparin Other (See Comments)     BP dropped     Ibuprofen Other (See Comments)     Stomach bleeding    Nsaids          Current Outpatient Medications:     benzonatate (TESSALON) 200 MG capsule, Take 1 capsule by mouth 3 (Three) Times a Day As Needed for Cough., Disp: 30 capsule, Rfl: 0    " buPROPion XL (WELLBUTRIN XL) 150 MG 24 hr tablet, TAKE 1 TABLET BY MOUTH DAILY, Disp: 30 tablet, Rfl: 3    cyanocobalamin 1000 MCG/ML injection, Inject 1 mL into the appropriate muscle as directed by prescriber Every 28 (Twenty-Eight) Days., Disp: 10 mL, Rfl: 1    diazePAM (VALIUM) 5 MG tablet, TAKE 1 TABLET BY MOUTH EACH DAY AS NEEDED FOR PANIC, Disp: , Rfl:     doxycycline (VIBRAMYCIN) 100 MG capsule, Take 1 capsule by mouth 2 (Two) Times a Day., Disp: 28 capsule, Rfl: 0    fluticasone (Flonase) 50 MCG/ACT nasal spray, 2 sprays into the nostril(s) as directed by provider Daily., Disp: 18 mL, Rfl: 5    Fluticasone-Salmeterol (ADVAIR/WIXELA) 250-50 MCG/ACT DISKUS, Inhale 1 puff 2 (Two) Times a Day., Disp: 60 each, Rfl: 3    hydrOXYzine (ATARAX) 50 MG tablet, Take 1 tablet by mouth At Night As Needed (sleep and anxiety)., Disp: 90 tablet, Rfl: 3    linaclotide (Linzess) 145 MCG capsule capsule, Take 1 capsule by mouth Every Morning Before Breakfast. (Patient not taking: Reported on 6/26/2023), Disp: 30 capsule, Rfl: 5    montelukast (Singulair) 10 MG tablet, Take 1 tablet by mouth Every Night., Disp: 30 tablet, Rfl: 3    ondansetron (ZOFRAN) 4 MG tablet, Take 1 tablet by mouth Every 8 (Eight) Hours As Needed for Nausea or Vomiting., Disp: 60 tablet, Rfl: 0    PARoxetine (PAXIL) 20 MG tablet, Take 1 tablet by mouth Every Morning., Disp: , Rfl:     pimecrolimus (ELIDEL) 1 % cream, Apply 1 application topically to the appropriate area as directed 2 (Two) Times a Day., Disp: 30 g, Rfl: 3    predniSONE (DELTASONE) 20 MG tablet, Take 3 tabs p.o. daily on day 1, then 2 tabs p.o. on days 2-3, then 1 tab p.o. on days 4-5.  You had your first dose in the ED so you would not need to start this prescription until 03/08/2024., Disp: 9 tablet, Rfl: 0    Syringe/Needle, Disp, (BD ECLIPSE SYRINGE) 25G X 1\" 3 ML misc, USE AS DIRECTED FOR B12 INJECTION, Disp: 100 each, Rfl: 11    traMADol (ULTRAM) 50 MG tablet, Take 1 tablet by mouth " Every 6 (Six) Hours As Needed for Moderate Pain., Disp: 120 tablet, Rfl: 2    triamcinolone (KENALOG) 0.1 % cream, Apply 1 application topically to the appropriate area as directed 2 (Two) Times a Day., Disp: 45 g, Rfl: 1    Trokendi  MG capsule sustained-release 24 hr, TAKE 1 CAPSULE BY MOUTH DAILY., Disp: 90 capsule, Rfl: 1    vitamin D (ERGOCALCIFEROL) 1.25 MG (21667 UT) capsule capsule, Take 1 capsule by mouth 1 (One) Time Per Week., Disp: 5 capsule, Rfl: 5    Past Medical History:   Diagnosis Date    GUIDO positive     Atrial septal defect     as a child. -repared    Chest pain     Fibromyalgia     Ganglion cyst     Gastric ulcer     Lyme disease     Memory deficit     Migraine with aura     Osteoarthritis     Plantar fasciitis     Therapeutic opioid induced constipation     Vitamin B12 deficiency     Vitamin D deficiency        Past Surgical History:   Procedure Laterality Date    CARDIAC SURGERY      open heart for defect repair of VSD    GALLBLADDER SURGERY  2007    GANGLION CYST EXCISION         Family History   Problem Relation Age of Onset    Breast cancer Mother 49    Heart attack Father     Diabetes Father     Hyperlipidemia Father     Hypertension Father     Leukemia Paternal Grandmother     Colon cancer Maternal Aunt     Hyperlipidemia Brother     Diabetes Maternal Grandmother     Asthma Maternal Grandmother     COPD Maternal Grandmother     Stroke Maternal Grandfather     Diabetes Maternal Grandfather     Diabetes Paternal Grandfather     Heart disease Other         FH of heart disease in males before age 55       Social History     Tobacco Use    Smoking status: Never    Smokeless tobacco: Never   Substance Use Topics    Alcohol use: Yes     Comment: Rare            Objective     Visit Vitals  LMP 02/20/2024 (Exact Date)    Video Visit    Physical Exam  NAD  AAOx3  No labored breathing.      Lab Results   Component Value Date    GLUCOSE 85 06/26/2023    BUN 8 06/26/2023    CREATININE 0.73  06/26/2023    EGFRIFNONA 101 04/19/2021    EGFRIFAFRI >60 10/03/2021    BCR 11.0 06/26/2023    K 4.2 06/26/2023    CO2 20.3 (L) 06/26/2023    CALCIUM 8.9 06/26/2023    PROTENTOTREF 6.5 06/26/2023    ALBUMIN 4.2 06/26/2023    LABIL2 1.8 06/26/2023    AST 18 06/26/2023    ALT 21 06/26/2023       WBC   Date Value Ref Range Status   06/26/2023 8.52 3.40 - 10.80 10*3/mm3 Final   10/03/2021 11.20 (H) 4.5 - 11.0 10*3/uL Final     RBC   Date Value Ref Range Status   06/26/2023 4.65 3.77 - 5.28 10*6/mm3 Final   10/03/2021 5.01 4.0 - 5.2 10*6/uL Final     Hemoglobin   Date Value Ref Range Status   06/26/2023 11.4 (L) 12.0 - 15.9 g/dL Final   10/03/2021 14.1 12.0 - 16.0 g/dL Final     Hematocrit   Date Value Ref Range Status   06/26/2023 36.0 34.0 - 46.6 % Final   10/03/2021 42.7 36.0 - 46.0 % Final     MCV   Date Value Ref Range Status   06/26/2023 77.4 (L) 79.0 - 97.0 fL Final   10/03/2021 85.2 80.0 - 100.0 fL Final     MCH   Date Value Ref Range Status   06/26/2023 24.5 (L) 26.6 - 33.0 pg Final   10/03/2021 28.1 26.0 - 34.0 pg Final     MCHC   Date Value Ref Range Status   06/26/2023 31.7 31.5 - 35.7 g/dL Final   10/03/2021 33.0 31.0 - 37.0 g/dL Final     RDW   Date Value Ref Range Status   06/26/2023 14.2 12.3 - 15.4 % Final   10/03/2021 12.6 12.0 - 16.8 % Final     MPV   Date Value Ref Range Status   10/03/2021 9.9 8.4 - 12.4 fL Final     Platelets   Date Value Ref Range Status   06/26/2023 354 140 - 450 10*3/mm3 Final   10/03/2021 268 140 - 440 10*3/uL Final     Neutrophil Rel %   Date Value Ref Range Status   06/26/2023 68.5 42.7 - 76.0 % Final   10/03/2021 84.5 (H) 45 - 80 % Final     Lymphocyte Rel %   Date Value Ref Range Status   06/26/2023 17.1 (L) 19.6 - 45.3 % Final   10/03/2021 9.5 (L) 15 - 50 % Final     Monocyte Rel %   Date Value Ref Range Status   06/26/2023 7.7 5.0 - 12.0 % Final   10/03/2021 4.5 0 - 15 % Final     Eosinophil Rel %   Date Value Ref Range Status   06/26/2023 4.9 0.3 - 6.2 % Final  "    Eosinophil %   Date Value Ref Range Status   10/03/2021 0.6 0 - 7 % Final     Basophil Rel %   Date Value Ref Range Status   06/26/2023 0.5 0.0 - 1.5 % Final   10/03/2021 0.4 0 - 2 % Final     Immature Grans %   Date Value Ref Range Status   10/03/2021 0.5 0.0 - 1.0 % Final     Neutrophils Absolute   Date Value Ref Range Status   06/26/2023 5.83 1.70 - 7.00 10*3/mm3 Final   10/03/2021 9.47 (H) 2.0 - 8.8 10*3/uL Final     Lymphocytes Absolute   Date Value Ref Range Status   06/26/2023 1.46 0.70 - 3.10 10*3/mm3 Final   10/03/2021 1.06 0.7 - 5.5 10*3/uL Final     Monocytes Absolute   Date Value Ref Range Status   06/26/2023 0.66 0.10 - 0.90 10*3/mm3 Final   10/03/2021 0.50 0.0 - 1.7 10*3/uL Final     Eosinophils Absolute   Date Value Ref Range Status   06/26/2023 0.42 (H) 0.00 - 0.40 10*3/mm3 Final   10/03/2021 0.07 0.0 - 0.8 10*3/uL Final     Basophils Absolute   Date Value Ref Range Status   06/26/2023 0.04 0.00 - 0.20 10*3/mm3 Final   10/03/2021 0.04 0.0 - 0.2 10*3/uL Final     Immature Grans, Absolute   Date Value Ref Range Status   10/03/2021 0.06 0.00 - 0.10 10*3/uL Final     nRBC   Date Value Ref Range Status   06/26/2023 0.0 0.0 - 0.2 /100 WBC Final   10/03/2021 0 0 /100(WBC) Final       No results found for: \"HGBA1C\"    Lab Results   Component Value Date    SDGYEAYS68 478 06/26/2023       TSH   Date Value Ref Range Status   06/26/2023 4.420 (H) 0.270 - 4.200 uIU/mL Final       No results found for: \"CHOL\"  Lab Results   Component Value Date    TRIG 109 06/26/2023     Lab Results   Component Value Date    HDL 62 (H) 06/26/2023     Lab Results   Component Value Date     (H) 06/26/2023     Lab Results   Component Value Date    VLDL 19 06/26/2023     No results found for: \"LDLHDL\"      Procedures    Assessment & Plan   Problems Addressed this Visit    None  Visit Diagnoses       Bronchitis    -  Primary    Relevant Medications    benzonatate (TESSALON) 200 MG capsule    Fluticasone-Salmeterol " (ADVAIR/WIXELA) 250-50 MCG/ACT DISKUS    montelukast (Singulair) 10 MG tablet    Wheezing        Relevant Medications    benzonatate (TESSALON) 200 MG capsule    Fluticasone-Salmeterol (ADVAIR/WIXELA) 250-50 MCG/ACT DISKUS    montelukast (Singulair) 10 MG tablet    Moderate persistent extrinsic asthma with acute exacerbation        Relevant Medications    benzonatate (TESSALON) 200 MG capsule    Fluticasone-Salmeterol (ADVAIR/WIXELA) 250-50 MCG/ACT DISKUS    montelukast (Singulair) 10 MG tablet    Chronic cough        Relevant Medications    benzonatate (TESSALON) 200 MG capsule    Fluticasone-Salmeterol (ADVAIR/WIXELA) 250-50 MCG/ACT DISKUS    montelukast (Singulair) 10 MG tablet          Diagnoses         Codes Comments    Bronchitis    -  Primary ICD-10-CM: J40  ICD-9-CM: 490     Wheezing     ICD-10-CM: R06.2  ICD-9-CM: 786.07     Moderate persistent extrinsic asthma with acute exacerbation     ICD-10-CM: J45.41  ICD-9-CM: 493.02     Chronic cough     ICD-10-CM: R05.3  ICD-9-CM: 786.2             No orders of the defined types were placed in this encounter.      Current Outpatient Medications   Medication Sig Dispense Refill    benzonatate (TESSALON) 200 MG capsule Take 1 capsule by mouth 3 (Three) Times a Day As Needed for Cough. 30 capsule 0    buPROPion XL (WELLBUTRIN XL) 150 MG 24 hr tablet TAKE 1 TABLET BY MOUTH DAILY 30 tablet 3    cyanocobalamin 1000 MCG/ML injection Inject 1 mL into the appropriate muscle as directed by prescriber Every 28 (Twenty-Eight) Days. 10 mL 1    diazePAM (VALIUM) 5 MG tablet TAKE 1 TABLET BY MOUTH EACH DAY AS NEEDED FOR PANIC      doxycycline (VIBRAMYCIN) 100 MG capsule Take 1 capsule by mouth 2 (Two) Times a Day. 28 capsule 0    fluticasone (Flonase) 50 MCG/ACT nasal spray 2 sprays into the nostril(s) as directed by provider Daily. 18 mL 5    Fluticasone-Salmeterol (ADVAIR/WIXELA) 250-50 MCG/ACT DISKUS Inhale 1 puff 2 (Two) Times a Day. 60 each 3    hydrOXYzine (ATARAX) 50 MG  "tablet Take 1 tablet by mouth At Night As Needed (sleep and anxiety). 90 tablet 3    linaclotide (Linzess) 145 MCG capsule capsule Take 1 capsule by mouth Every Morning Before Breakfast. (Patient not taking: Reported on 6/26/2023) 30 capsule 5    montelukast (Singulair) 10 MG tablet Take 1 tablet by mouth Every Night. 30 tablet 3    ondansetron (ZOFRAN) 4 MG tablet Take 1 tablet by mouth Every 8 (Eight) Hours As Needed for Nausea or Vomiting. 60 tablet 0    PARoxetine (PAXIL) 20 MG tablet Take 1 tablet by mouth Every Morning.      pimecrolimus (ELIDEL) 1 % cream Apply 1 application topically to the appropriate area as directed 2 (Two) Times a Day. 30 g 3    predniSONE (DELTASONE) 20 MG tablet Take 3 tabs p.o. daily on day 1, then 2 tabs p.o. on days 2-3, then 1 tab p.o. on days 4-5.  You had your first dose in the ED so you would not need to start this prescription until 03/08/2024. 9 tablet 0    Syringe/Needle, Disp, (BD ECLIPSE SYRINGE) 25G X 1\" 3 ML misc USE AS DIRECTED FOR B12 INJECTION 100 each 11    traMADol (ULTRAM) 50 MG tablet Take 1 tablet by mouth Every 6 (Six) Hours As Needed for Moderate Pain. 120 tablet 2    triamcinolone (KENALOG) 0.1 % cream Apply 1 application topically to the appropriate area as directed 2 (Two) Times a Day. 45 g 1    Trokendi  MG capsule sustained-release 24 hr TAKE 1 CAPSULE BY MOUTH DAILY. 90 capsule 1    vitamin D (ERGOCALCIFEROL) 1.25 MG (31832 UT) capsule capsule Take 1 capsule by mouth 1 (One) Time Per Week. 5 capsule 5     No current facility-administered medications for this visit.       No follow-ups on file.    There are no Patient Instructions on file for this visit.         Time spent on visit:  15   minutes.  This patient has consented to a telehealth visit via video. The visit was scheduled as a video visit to comply with patient safety concerns in accordance with CDC recommendations.  All vitals recorded within this visit are reported by the patient.    Start " Singulair, Advair, Tessalon Perles.  Side effects discussed.  Follow-up if no better.  Symptomatic treatment and otc meds and fluids and rest.  Follow up if no better.

## 2024-03-27 RX ORDER — AZITHROMYCIN 250 MG/1
TABLET, FILM COATED ORAL
Qty: 6 TABLET | Refills: 0 | Status: SHIPPED | OUTPATIENT
Start: 2024-03-27

## 2024-03-27 RX ORDER — PREDNISONE 20 MG/1
TABLET ORAL
Qty: 22 TABLET | Refills: 0 | Status: SHIPPED | OUTPATIENT
Start: 2024-03-27

## 2024-06-19 DIAGNOSIS — R06.2 WHEEZING: ICD-10-CM

## 2024-06-19 DIAGNOSIS — J40 BRONCHITIS: ICD-10-CM

## 2024-06-19 DIAGNOSIS — J45.41 MODERATE PERSISTENT EXTRINSIC ASTHMA WITH ACUTE EXACERBATION: ICD-10-CM

## 2024-06-19 DIAGNOSIS — R05.3 CHRONIC COUGH: ICD-10-CM

## 2024-06-19 RX ORDER — FLUTICASONE PROPIONATE AND SALMETEROL 50; 250 UG/1; UG/1
1 POWDER RESPIRATORY (INHALATION) 2 TIMES DAILY
Qty: 60 EACH | Refills: 0 | Status: SHIPPED | OUTPATIENT
Start: 2024-06-19